# Patient Record
Sex: MALE | Race: WHITE | NOT HISPANIC OR LATINO | Employment: FULL TIME | URBAN - METROPOLITAN AREA
[De-identification: names, ages, dates, MRNs, and addresses within clinical notes are randomized per-mention and may not be internally consistent; named-entity substitution may affect disease eponyms.]

---

## 2017-01-11 ENCOUNTER — OFFICE VISIT (OUTPATIENT)
Dept: MEDICAL GROUP | Facility: MEDICAL CENTER | Age: 57
End: 2017-01-11
Payer: COMMERCIAL

## 2017-01-11 VITALS
HEIGHT: 69 IN | DIASTOLIC BLOOD PRESSURE: 70 MMHG | OXYGEN SATURATION: 95 % | RESPIRATION RATE: 16 BRPM | WEIGHT: 253 LBS | HEART RATE: 78 BPM | SYSTOLIC BLOOD PRESSURE: 142 MMHG | BODY MASS INDEX: 37.47 KG/M2 | TEMPERATURE: 97.3 F

## 2017-01-11 DIAGNOSIS — I48.0 PAF (PAROXYSMAL ATRIAL FIBRILLATION) (HCC): ICD-10-CM

## 2017-01-11 DIAGNOSIS — J02.0 STREP THROAT: ICD-10-CM

## 2017-01-11 LAB
INT CON NEG: NEGATIVE
INT CON POS: POSITIVE
S PYO AG THROAT QL: NORMAL

## 2017-01-11 PROCEDURE — 87880 STREP A ASSAY W/OPTIC: CPT | Performed by: NURSE PRACTITIONER

## 2017-01-11 PROCEDURE — 99214 OFFICE O/P EST MOD 30 MIN: CPT | Performed by: NURSE PRACTITIONER

## 2017-01-11 RX ORDER — AMOXICILLIN 875 MG/1
875 TABLET, COATED ORAL 2 TIMES DAILY
Qty: 20 TAB | Refills: 0 | Status: SHIPPED | OUTPATIENT
Start: 2017-01-11 | End: 2017-04-18

## 2017-01-11 ASSESSMENT — ENCOUNTER SYMPTOMS
FEVER: 1
SORE THROAT: 1
COUGH: 1

## 2017-01-11 NOTE — PROGRESS NOTES
Subjective:      Jude Jiménez is a 56 y.o. male who presents with Sore Throat; Cough; and Runny Nose            Cough  Associated symptoms include a fever and a sore throat.   Jude Jiménez is here today for sore throat.    1. Strep throat  Patient reports his symptoms started about 10 days ago. His main complaint is sore throat although he also complains of mild cough and possible low-grade fevers. Symptoms are worse with swallowing and nothing makes them feel better. He has not tried anything over-the-counter. He denies chills, myalgias, dizziness, chest pain or shortness of breath.    2. PAF (paroxysmal atrial fibrillation) ([facility])  Patient has history of paroxysmal atrial fibrillation and he states he has been off anticoagulation except for a regular strength aspirin. It does not appear that he is seeing his cardiologist since 2014. He denies palpitations, shortness of breath or chest pain. He states he has an appointment with cardiology although I cannot find this in EPIC. He states he is currently taking no beta blockers.    Social History   Substance Use Topics   • Smoking status: Former Smoker -- 0.50 packs/day for 15 years     Types: Cigarettes     Quit date: 01/01/1992   • Smokeless tobacco: Never Used   • Alcohol Use: 33.6 oz/week     42 Glasses of wine, 14 Shots of liquor per week      Comment: around 6 glasses of wine every day and 2-3 shots of tequilla     Current Outpatient Prescriptions   Medication Sig Dispense Refill   • metoprolol (LOPRESSOR) 25 MG Tab Take 1 Tab by mouth 2 times a day. 60 Tab 5   • amoxicillin (AMOXIL) 875 MG tablet Take 1 Tab by mouth 2 times a day. 20 Tab 0     No current facility-administered medications for this visit.     Past Medical History   Diagnosis Date   • AF (atrial fibrillation) ([facility]) 6/24/2010   • Alcohol abuse    • Chronic anticoagulation    • Atrial fibrillation ([facility]) July 11, 2014     AF with RVR   • Infectious disease    • Hypertension    •  "Arthritis      \"rhematoid eyes\"   • Snoring    • JACE (obstructive sleep apnea) 6/24/2010     uses CPAP   • Sleep apnea    • Other emphysema ([facility])    • Cold 6/25/14   • Pneumonia 6/25/14     ?   • Bronchitis 6/25/14     ?   • PAF (paroxysmal atrial fibrillation) ([facility]) 10/1/2014     Family History   Problem Relation Age of Onset   • Hypertension Mother        Review of Systems   Constitutional: Positive for fever.   HENT: Positive for sore throat.    Respiratory: Positive for cough.    All other systems reviewed and are negative.         Objective:     /70 mmHg  Pulse 78  Temp(Src) 36.3 °C (97.3 °F)  Resp 16  Ht 1.753 m (5' 9\")  Wt 114.76 kg (253 lb)  BMI 37.34 kg/m2  SpO2 95%     Physical Exam   Constitutional: He is oriented to person, place, and time. He appears well-developed and well-nourished. No distress.   HENT:   Head: Normocephalic and atraumatic.   Right Ear: External ear normal.   Left Ear: External ear normal.   Nose: Nose normal.   Mouth/Throat: Posterior oropharyngeal erythema present.   Eyes: Conjunctivae are normal. Right eye exhibits no discharge. Left eye exhibits no discharge.   Neck: Normal range of motion. Neck supple. No tracheal deviation present. No thyromegaly present.   Cardiovascular: Normal rate and normal heart sounds.  An irregularly irregular rhythm present.   No murmur heard.  Pulmonary/Chest: Effort normal and breath sounds normal. No respiratory distress. He has no wheezes. He has no rales.   Lymphadenopathy:     He has no cervical adenopathy.   Neurological: He is alert and oriented to person, place, and time. Coordination normal.   Skin: Skin is warm and dry. No rash noted. He is not diaphoretic. No erythema.   Psychiatric: He has a normal mood and affect. His behavior is normal. Judgment and thought content normal.   Nursing note and vitals reviewed.              Assessment/Plan:         1. Strep throat  Rapid strep is positive so patient will start on antibiotics and " he reports no allergies to penicillin.  Pharyngitis teaching provided including:  A. Ibuprofen, Aleve, or Tylenol for pain and fever.  B. Chloraseptic spray or lozenges for topical relief.  C. Increase humidity in house.  D. Consider over-the-counter nasal spray if postnasal drip contributing to pain.  E. Avoid spicy or acidic foods and fluids which may irritate the throat.  - POCT Rapid Strep A  - amoxicillin (AMOXIL) 875 MG tablet; Take 1 Tab by mouth 2 times a day.  Dispense: 20 Tab; Refill: 0    2. PAF (paroxysmal atrial fibrillation) ([facility])  Patient has not seen cardiology in about 2 years and although he states he has an appointment I do not see anything on the schedule so I told him to contact his cardiologist to make an appointment. His heart rhythm does sound and atrial fib today so I will start him on metoprolol since his rate is running up around 100. I told him to continue on his full dose aspirin and may need to go on anticoagulation after seeing cardiology. I am not sure if this is paroxysmal.  - metoprolol (LOPRESSOR) 25 MG Tab; Take 1 Tab by mouth 2 times a day.  Dispense: 60 Tab; Refill: 5

## 2017-01-11 NOTE — MR AVS SNAPSHOT
"        Jude Jiménez   2017 8:20 AM   Office Visit   MRN: [id number]    Department:  [address] [place] [address]   Dept Phone:  [phone]    Description:  Male : 1960   Provider:  TOMASA Styles           Reason for Visit     Sore Throat     Cough     Runny Nose           Allergies as of 2017     No Known Allergies      You were diagnosed with     PAF (paroxysmal atrial fibrillation) ([facility])   [317067]       Strep throat   [049474]         Vital Signs     Blood Pressure Pulse Temperature Respirations Height Weight    142/70 mmHg 78 36.3 °C (97.3 °F) 16 1.753 m (5' 9\") 114.76 kg (253 lb)    Body Mass Index Oxygen Saturation Smoking Status             37.34 kg/m2 95% Former Smoker         Basic Information     Date Of Birth Sex Race Ethnicity Preferred Language    1960 Male White Non- English      Problem List              ICD-10-CM Priority Class Noted - Resolved    JACE (obstructive sleep apnea) (Chronic) G47.33 High  2010 - Present    Iritis H20.9   3/17/2011 - Present    Obesity E66.9   2013 - Present    Alcohol abuse F10.10 High  Unknown - Present    Chronic anticoagulation Z79.01 Medium  Unknown - Present    PAF (paroxysmal atrial fibrillation) ([facility]) I48.0   10/1/2014 - Present    IGT (impaired glucose tolerance) R73.02   11/10/2016 - Present      Health Maintenance        Date Due Completion Dates    IMM DTaP/Tdap/Td Vaccine (1 - Tdap) 1978 ---    COLON CANCER SCREENING ANNUAL FIT 3/20/2017 3/20/2016            Current Immunizations     Influenza Vaccine Quad Inj (Pf) 11/10/2016      Below and/or attached are the medications your provider expects you to take. Review all of your home medications and newly ordered medications with your provider and/or pharmacist. Follow medication instructions as directed by your provider and/or pharmacist. Please keep your medication list with you and share with your provider. Update the information when medications are " discontinued, doses are changed, or new medications (including over-the-counter products) are added; and carry medication information at all times in the event of emergency situations     Allergies:  No Known Allergies          Medications  Valid as of: January 11, 2017 -  8:22 AM    Generic Name Brand Name Tablet Size Instructions for use    Amoxicillin (Tab) AMOXIL 875 MG Take 1 Tab by mouth 2 times a day.        Metoprolol Tartrate (Tab) LOPRESSOR 25 MG Take 1 Tab by mouth 2 times a day.        .                 Medicines prescribed today were sent to:     [facility] [address] [place] [address], [place] - [address]    [address] [place] [address]    Phone: [phone] Fax: [phone]    Open 24 Hours?: No      Medication refill instructions:       If your prescription bottle indicates you have medication refills left, it is not necessary to call your provider’s office. Please contact your pharmacy and they will refill your medication.    If your prescription bottle indicates you do not have any refills left, you may request refills at any time through one of the following ways: The online [employer] system (except Urgent Care), by calling your provider’s office, or by asking your pharmacy to contact your provider’s office with a refill request. Medication refills are processed only during regular business hours and may not be available until the next business day. Your provider may request additional information or to have a follow-up visit with you prior to refilling your medication.   *Please Note: Medication refills are assigned a new Rx number when refilled electronically. Your pharmacy may indicate that no refills were authorized even though a new prescription for the same medication is available at the pharmacy. Please request the medicine by name with the pharmacy before contacting your provider for a refill.           MyChart Status: Patient Declined

## 2017-03-16 ENCOUNTER — OFFICE VISIT (OUTPATIENT)
Dept: CARDIOLOGY | Facility: MEDICAL CENTER | Age: 57
End: 2017-03-16
Payer: COMMERCIAL

## 2017-03-16 VITALS
DIASTOLIC BLOOD PRESSURE: 90 MMHG | HEIGHT: 69 IN | WEIGHT: 266 LBS | SYSTOLIC BLOOD PRESSURE: 130 MMHG | BODY MASS INDEX: 39.4 KG/M2 | OXYGEN SATURATION: 95 % | HEART RATE: 82 BPM

## 2017-03-16 DIAGNOSIS — I48.0 PAF (PAROXYSMAL ATRIAL FIBRILLATION) (HCC): ICD-10-CM

## 2017-03-16 LAB — EKG IMPRESSION: NORMAL

## 2017-03-16 PROCEDURE — 93000 ELECTROCARDIOGRAM COMPLETE: CPT | Performed by: INTERNAL MEDICINE

## 2017-03-16 PROCEDURE — 99214 OFFICE O/P EST MOD 30 MIN: CPT | Performed by: INTERNAL MEDICINE

## 2017-03-16 RX ORDER — IBUPROFEN 200 MG
200 TABLET ORAL EVERY 6 HOURS PRN
COMMUNITY
End: 2017-03-16

## 2017-03-16 RX ORDER — METOPROLOL SUCCINATE 100 MG/1
100 TABLET, EXTENDED RELEASE ORAL DAILY
Qty: 30 TAB | Refills: 11 | Status: SHIPPED | OUTPATIENT
Start: 2017-03-16 | End: 2018-03-16 | Stop reason: SDUPTHER

## 2017-03-16 NOTE — Clinical Note
"     [facility]   [address]  [place] [address]  Phone: [phone]  Fax: [phone]              Jude Jiménez  1960    Encounter Date: 3/16/2017    Wayne Fleming M.D.          PROGRESS NOTE:  Subjective:   Jude Jiménez is a 56 y.o. male who presents today for follow-up visit atrial fibrillation. He has a history of cardioversion in August 2014. He has not had follow-up since that cardioversion.    Patient feels he went back into atrial fibrillation at the end of 2014. However, he has not had cardiology follow-up. Anticoagulation was discontinued a couple of months after his cardioversion. He continues on aspirin therapy.    He's noted no palpitations or lightheadedness. He has noted some dyspnea on exertion when he walks about a quarter of a mile. He's had no PND, orthopnea or edema. He's noted no chest discomfort.    Past Medical History   Diagnosis Date   • AF (atrial fibrillation) (CMS-HCC) 6/24/2010   • Alcohol abuse    • Chronic anticoagulation    • Atrial fibrillation (CMS-HCC) July 11, 2014     AF with RVR   • Infectious disease    • Hypertension    • Arthritis      \"rhematoid eyes\"   • Snoring    • JACE (obstructive sleep apnea) 6/24/2010     uses CPAP   • Sleep apnea    • Other emphysema (CMS-HCC)    • Cold 6/25/14   • Pneumonia 6/25/14     ?   • Bronchitis 6/25/14     ?   • PAF (paroxysmal atrial fibrillation) (CMS-HCC) 10/1/2014     Past Surgical History   Procedure Laterality Date   • Recovery  8/7/2014     Performed by Cath-Recovery Surgery at SURGERY SAME DAY [facility] ORS     Family History   Problem Relation Age of Onset   • Hypertension Mother      History   Smoking status   • Former Smoker -- 0.50 packs/day for 15 years   • Types: Cigarettes   • Quit date: 01/01/1992   Smokeless tobacco   • Never Used     No Known Allergies  Outpatient Encounter Prescriptions as of 3/16/2017   Medication Sig Dispense Refill   • " "aspirin EC (ECOTRIN) 325 MG Tablet Delayed Response Take 325 mg by mouth every day.     • ibuprofen (MOTRIN) 200 MG Tab Take 200 mg by mouth every 6 hours as needed.     • metoprolol (LOPRESSOR) 25 MG Tab Take 1 Tab by mouth 2 times a day. 60 Tab 5   • amoxicillin (AMOXIL) 875 MG tablet Take 1 Tab by mouth 2 times a day. (Patient not taking: Reported on 3/16/2017) 20 Tab 0     No facility-administered encounter medications on file as of 3/16/2017.     ROS     Objective:   /90 mmHg  Pulse 82  Ht 1.753 m (5' 9.02\")  Wt 120.657 kg (266 lb)  BMI 39.26 kg/m2  SpO2 95%    Physical Exam   Neck: No JVD present.   Cardiovascular: Normal rate.  An irregularly irregular rhythm present. Exam reveals no gallop.    No murmur heard.  Pulmonary/Chest: Effort normal. He has no rales.   Abdominal: Soft. There is no tenderness.   Musculoskeletal: He exhibits no edema.     Echocardiogram:  CONCLUSIONS  Technically difficult study.   Left ventricle is mildly dilated.  Mild concentric left ventricular   hypertrophy suggested.  Mildly reduced left ventricular systolic function.  Moderately dilated left atrium.  Mild mitral regurgitation.  Mild aortic insufficiency.  Atrial fibrillation.  Probable moderate pulmonary hypertension.  Other findings as noted above.  Final Date:     21 July 2014     Assessment:     1. PAF (paroxysmal atrial fibrillation) ([facility])         Medical Decision Making:  Today's Assessment / Status / Plan:     Mr. Jiménez is having difficulty with atrial fibrillation which is probably persistent at the present time. He'll be reevaluated with an echocardiogram. Until we have his echocardiogram results and decide whether or not he should undergo cardioversion, we will place him on anticoagulation therapy. He will follow-up in about a month. He will see Dr. Quiñonez if possible. Otherwise, he will follow-up with myself or one of our nurse practitioners.      Sahil Marquez M.D.   Rebecca Cohen " [address]  [place] [id number]  VIA In Basket

## 2017-03-16 NOTE — MR AVS SNAPSHOT
"        Jude Jiménez   3/16/2017 7:30 AM   Office Visit   MRN: [id number]    Department:  [facility]   Dept Phone:  [phone]    Description:  Male : 1960   Provider:  Wayne Fleming M.D.           Reason for Visit     Follow-Up           Allergies as of 3/16/2017     No Known Allergies      You were diagnosed with     PAF (paroxysmal atrial fibrillation) ([facility])   [[id number]]       Paroxysmal atrial fibrillation (CMS-HCC)   [842908]         Vital Signs     Blood Pressure Pulse Height Weight Body Mass Index Oxygen Saturation    130/90 mmHg 82 1.753 m (5' 9.02\") 120.657 kg (266 lb) 39.26 kg/m2 95%    Smoking Status                   Former Smoker           Basic Information     Date Of Birth Sex Race Ethnicity Preferred Language    1960 Male White Non- English      Your appointments     Apr 10, 2017  7:15 AM   ECHO with [facility] EXAM 9   [facility]    [address]  [place] [address]   [phone]           No prep            2017  7:40 AM   FOLLOW UP with TOMASA Worthy   [facility] (--)    [address], [address]  [place] [id number] [phone]              Problem List              ICD-10-CM Priority Class Noted - Resolved    JACE (obstructive sleep apnea) (Chronic) G47.33 High  2010 - Present    Iritis H20.9   3/17/2011 - Present    Obesity E66.9   2013 - Present    Alcohol abuse F10.10 High  Unknown - Present    Chronic anticoagulation Z79.01 Medium  Unknown - Present    PAF (paroxysmal atrial fibrillation) (CMS-HCC) I48.0   10/1/2014 - Present    IGT (impaired glucose tolerance) R73.02   11/10/2016 - Present      Health Maintenance        Date Due Completion Dates    IMM DTaP/Tdap/Td Vaccine (1 - Tdap) 1978 ---    COLON CANCER SCREENING ANNUAL FIT 3/20/2017 3/20/2016            Results       Current Immunizations     Influenza Vaccine Quad Inj (Pf) 11/10/2016      Below " and/or attached are the medications your provider expects you to take. Review all of your home medications and newly ordered medications with your provider and/or pharmacist. Follow medication instructions as directed by your provider and/or pharmacist. Please keep your medication list with you and share with your provider. Update the information when medications are discontinued, doses are changed, or new medications (including over-the-counter products) are added; and carry medication information at all times in the event of emergency situations     Allergies:  No Known Allergies          Medications  Valid as of: March 16, 2017 -  8:06 AM    Generic Name Brand Name Tablet Size Instructions for use    Amoxicillin (Tab) AMOXIL 875 MG Take 1 Tab by mouth 2 times a day.        Apixaban (Tab) ELIQUIS 5mg Take 1 Tab by mouth 2 Times a Day.        Metoprolol Succinate (TABLET SR 24 HR) TOPROL  MG Take 1 Tab by mouth every day.        Metoprolol Tartrate (Tab) LOPRESSOR 25 MG Take 1 Tab by mouth 2 times a day.        .                 Medicines prescribed today were sent to:     [facility] [address] [place], [place] [address] [address] [place], [place] [address] [address] [place] [address]    Phone: [phone] Fax: [phone]    Open 24 Hours?: No      Medication refill instructions:       If your prescription bottle indicates you have medication refills left, it is not necessary to call your provider’s office. Please contact your pharmacy and they will refill your medication.    If your prescription bottle indicates you do not have any refills left, you may request refills at any time through one of the following ways: The online [email or web address] system (except Urgent Care), by calling your provider’s office, or by asking your pharmacy to contact your provider’s office with a refill request. Medication refills are processed only during regular business hours and may not be available until the next business day. Your provider may request additional  information or to have a follow-up visit with you prior to refilling your medication.   *Please Note: Medication refills are assigned a new Rx number when refilled electronically. Your pharmacy may indicate that no refills were authorized even though a new prescription for the same medication is available at the pharmacy. Please request the medicine by name with the pharmacy before contacting your provider for a refill.        Your To Do List     Future Labs/Procedures Complete By Expires    Echocardiogram Comp w/o Cont  As directed 3/16/2018    Scheduling Instructions:    Less than one month         MyChart Status: Patient Declined

## 2017-03-16 NOTE — PROGRESS NOTES
"Subjective:   Jude Jiménez is a 56 y.o. male who presents today for follow-up visit atrial fibrillation. He has a history of cardioversion in August 2014. He has not had follow-up since that cardioversion.    Patient feels he went back into atrial fibrillation at the end of 2014. However, he has not had cardiology follow-up. Anticoagulation was discontinued a couple of months after his cardioversion. He continues on aspirin therapy.    He's noted no palpitations or lightheadedness. He has noted some dyspnea on exertion when he walks about a quarter of a mile. He's had no PND, orthopnea or edema. He's noted no chest discomfort.    Past Medical History   Diagnosis Date   • AF (atrial fibrillation) (CMS-HCC) 6/24/2010   • Alcohol abuse    • Chronic anticoagulation    • Atrial fibrillation (CMS-HCC) July 11, 2014     AF with RVR   • Infectious disease    • Hypertension    • Arthritis      \"rhematoid eyes\"   • Snoring    • JACE (obstructive sleep apnea) 6/24/2010     uses CPAP   • Sleep apnea    • Other emphysema (CMS-HCC)    • Cold 6/25/14   • Pneumonia 6/25/14     ?   • Bronchitis 6/25/14     ?   • PAF (paroxysmal atrial fibrillation) (CMS-HCC) 10/1/2014     Past Surgical History   Procedure Laterality Date   • Recovery  8/7/2014     Performed by Cath-Recovery Surgery at SURGERY SAME DAY [facility]     Family History   Problem Relation Age of Onset   • Hypertension Mother      History   Smoking status   • Former Smoker -- 0.50 packs/day for 15 years   • Types: Cigarettes   • Quit date: 01/01/1992   Smokeless tobacco   • Never Used     No Known Allergies  Outpatient Encounter Prescriptions as of 3/16/2017   Medication Sig Dispense Refill   • aspirin EC (ECOTRIN) 325 MG Tablet Delayed Response Take 325 mg by mouth every day.     • ibuprofen (MOTRIN) 200 MG Tab Take 200 mg by mouth every 6 hours as needed.     • metoprolol (LOPRESSOR) 25 MG Tab Take 1 Tab by mouth 2 times a day. 60 Tab 5   • amoxicillin (AMOXIL) " "875 MG tablet Take 1 Tab by mouth 2 times a day. (Patient not taking: Reported on 3/16/2017) 20 Tab 0     No facility-administered encounter medications on file as of 3/16/2017.     ROS     Objective:   /90 mmHg  Pulse 82  Ht 1.753 m (5' 9.02\")  Wt 120.657 kg (266 lb)  BMI 39.26 kg/m2  SpO2 95%    Physical Exam   Neck: No JVD present.   Cardiovascular: Normal rate.  An irregularly irregular rhythm present. Exam reveals no gallop.    No murmur heard.  Pulmonary/Chest: Effort normal. He has no rales.   Abdominal: Soft. There is no tenderness.   Musculoskeletal: He exhibits no edema.     Echocardiogram:  CONCLUSIONS  Technically difficult study.   Left ventricle is mildly dilated.  Mild concentric left ventricular   hypertrophy suggested.  Mildly reduced left ventricular systolic function.  Moderately dilated left atrium.  Mild mitral regurgitation.  Mild aortic insufficiency.  Atrial fibrillation.  Probable moderate pulmonary hypertension.  Other findings as noted above.  Final Date:     21 July 2014     Assessment:     1. PAF (paroxysmal atrial fibrillation) ([facility])         Medical Decision Making:  Today's Assessment / Status / Plan:     Mr. Jiménez is having difficulty with atrial fibrillation which is probably persistent at the present time. He'll be reevaluated with an echocardiogram. Until we have his echocardiogram results and decide whether or not he should undergo cardioversion, we will place him on anticoagulation therapy. He will follow-up in about a month. He will see Dr. Quiñonez if possible. Otherwise, he will follow-up with myself or one of our nurse practitioners.  "

## 2017-03-17 ENCOUNTER — TELEPHONE (OUTPATIENT)
Dept: CARDIOLOGY | Facility: MEDICAL CENTER | Age: 57
End: 2017-03-17

## 2017-03-17 DIAGNOSIS — I48.0 PAF (PAROXYSMAL ATRIAL FIBRILLATION) (HCC): ICD-10-CM

## 2017-03-17 NOTE — TELEPHONE ENCOUNTER
----- Message from Bertha Richard, Med Ass't sent at 3/17/2017 11:45 AM PDT -----  Regarding: RE: Prescription for Eliquis is too expensive  I believe it is Xarelto but I would recommend that the patient call their plan and see which on is at a lower tier    ----- Message -----     From: Loyda Munson R.N.     Sent: 3/17/2017   9:42 AM       To: Bertha Richard, Med Ass't  Subject: FW: Prescription for Eliquis is too expensive    Hi Bertha,    Do you know which anticoagulation medication is preferred by [facility]?    ELADIO RN    ----- Message -----     From: Trish Washington     Sent: 3/17/2017   7:49 AM       To: Loyda Munson R.N.  Subject: Prescription for Eliquis is too expensive        ELDER/Loyda    Patient was given a prescription for Eliquis, but it costs $100.00 and he can't afford it. He wants a call back at [phone] to find out what his options are.

## 2017-03-17 NOTE — TELEPHONE ENCOUNTER
Left patient a voicemail with instructions to call the office regarding his medication issue.    ELADIO MORENO

## 2017-03-20 NOTE — TELEPHONE ENCOUNTER
Spoke with Dr. Fleming via [employer], patient can be prescribed Xarelto 20 mg daily as an alternative to Eliquis.    Prescription called in to [facility], Eliquis discontinued.    Called patient and advised him of the above.    ELADIO RN

## 2017-04-10 ENCOUNTER — HOSPITAL ENCOUNTER (OUTPATIENT)
Dept: CARDIOLOGY | Facility: MEDICAL CENTER | Age: 57
End: 2017-04-10
Attending: INTERNAL MEDICINE
Payer: COMMERCIAL

## 2017-04-10 DIAGNOSIS — I48.0 PAF (PAROXYSMAL ATRIAL FIBRILLATION) (HCC): ICD-10-CM

## 2017-04-10 PROCEDURE — 93306 TTE W/DOPPLER COMPLETE: CPT

## 2017-04-10 PROCEDURE — 93306 TTE W/DOPPLER COMPLETE: CPT | Mod: 26 | Performed by: INTERNAL MEDICINE

## 2017-04-11 LAB — LV EJECT FRACT  99904: 60

## 2017-04-18 ENCOUNTER — OFFICE VISIT (OUTPATIENT)
Dept: CARDIOLOGY | Facility: MEDICAL CENTER | Age: 57
End: 2017-04-18
Payer: COMMERCIAL

## 2017-04-18 VITALS
WEIGHT: 269 LBS | BODY MASS INDEX: 39.84 KG/M2 | OXYGEN SATURATION: 94 % | DIASTOLIC BLOOD PRESSURE: 74 MMHG | SYSTOLIC BLOOD PRESSURE: 116 MMHG | HEIGHT: 69 IN | HEART RATE: 56 BPM

## 2017-04-18 DIAGNOSIS — Z79.01 CHRONIC ANTICOAGULATION: ICD-10-CM

## 2017-04-18 DIAGNOSIS — G47.33 OSA (OBSTRUCTIVE SLEEP APNEA): Chronic | ICD-10-CM

## 2017-04-18 DIAGNOSIS — I48.0 PAF (PAROXYSMAL ATRIAL FIBRILLATION) (HCC): ICD-10-CM

## 2017-04-18 PROCEDURE — 99214 OFFICE O/P EST MOD 30 MIN: CPT | Performed by: NURSE PRACTITIONER

## 2017-04-18 ASSESSMENT — ENCOUNTER SYMPTOMS
DIZZINESS: 0
SHORTNESS OF BREATH: 0
WEAKNESS: 0
WHEEZING: 0
FOCAL WEAKNESS: 0
BLURRED VISION: 0
FALLS: 0
COUGH: 0
PALPITATIONS: 0
BLOOD IN STOOL: 0
BRUISES/BLEEDS EASILY: 0
DOUBLE VISION: 0
LOSS OF CONSCIOUSNESS: 0
ORTHOPNEA: 0
HEADACHES: 0

## 2017-04-18 NOTE — MR AVS SNAPSHOT
"        Jude Jiménez   2017 7:40 AM   Office Visit   MRN: [id number]    Department:  [facility]   Dept Phone:  [phone]    Description:  Male : 1960   Provider:  WERO Alves           Reason for Visit     Follow-Up           Allergies as of 2017     No Known Allergies      You were diagnosed with     PAF (paroxysmal atrial fibrillation) (CMS-[facility])   [622790]       JACE (obstructive sleep apnea)   [201724]       Chronic anticoagulation   [322622]         Vital Signs     Blood Pressure Pulse Height Weight Body Mass Index Oxygen Saturation    116/74 mmHg 55 1.753 m (5' 9\") 122.018 kg (269 lb) 39.71 kg/m2 94%    Smoking Status                   Former Smoker           Basic Information     Date Of Birth Sex Race Ethnicity Preferred Language    1960 Male White Non- English      Problem List              ICD-10-CM Priority Class Noted - Resolved    JACE (obstructive sleep apnea) (Chronic) G47.33 High  2010 - Present    Iritis H20.9   3/17/2011 - Present    Obesity E66.9   2013 - Present    Alcohol abuse F10.10 High  Unknown - Present    Chronic anticoagulation Z79.01 Medium  Unknown - Present    PAF (paroxysmal atrial fibrillation) (CMS-HCC) I48.0   10/1/2014 - Present    IGT (impaired glucose tolerance) R73.02   11/10/2016 - Present      Health Maintenance        Date Due Completion Dates    IMM DTaP/Tdap/Td Vaccine (1 - Tdap) 1978 ---    COLON CANCER SCREENING ANNUAL FIT 3/20/2017 3/20/2016            Current Immunizations     Influenza Vaccine Quad Inj (Pf) 11/10/2016      Below and/or attached are the medications your provider expects you to take. Review all of your home medications and newly ordered medications with your provider and/or pharmacist. Follow medication instructions as directed by your provider and/or pharmacist. Please keep your medication list with you and share with your provider. Update the information when medications are " discontinued, doses are changed, or new medications (including over-the-counter products) are added; and carry medication information at all times in the event of emergency situations     Allergies:  No Known Allergies          Medications  Valid as of: April 18, 2017 -  8:16 AM    Generic Name Brand Name Tablet Size Instructions for use    Metoprolol Succinate (TABLET SR 24 HR) TOPROL  MG Take 1 Tab by mouth every day.        Rivaroxaban (Tab) XARELTO 20 MG Take 1 Tab by mouth with dinner.        .                 Medicines prescribed today were sent to:     [facility] [address] [place] [address], [place] - [address]    [address] [place] [address]    Phone: [phone] Fax: [phone]    Open 24 Hours?: No      Medication refill instructions:       If your prescription bottle indicates you have medication refills left, it is not necessary to call your provider’s office. Please contact your pharmacy and they will refill your medication.    If your prescription bottle indicates you do not have any refills left, you may request refills at any time through one of the following ways: The online [employer] system (except Urgent Care), by calling your provider’s office, or by asking your pharmacy to contact your provider’s office with a refill request. Medication refills are processed only during regular business hours and may not be available until the next business day. Your provider may request additional information or to have a follow-up visit with you prior to refilling your medication.   *Please Note: Medication refills are assigned a new Rx number when refilled electronically. Your pharmacy may indicate that no refills were authorized even though a new prescription for the same medication is available at the pharmacy. Please request the medicine by name with the pharmacy before contacting your provider for a refill.        Referral     A referral request has been sent to our patient care coordination department.  Please allow 3-5 business days for us to process this request and contact you either by phone or mail. If you do not hear from us by the 5th business day, please call us at [phone].           MyChart Status: Patient Declined

## 2017-04-18 NOTE — PROGRESS NOTES
"Subjective:   Jude Jiménez is a 56 y.o. male who presents today for follow up P. Afib and JACE.    He is patient of Dr. Fleming in our clinic, HX: Paroxysmal atrial fibrillation, hypertension, emphysema, and obstructive sleep apnea.    Patient was seen about a month ago for follow-up atrial fibrillation with Dr. Fleming. He has noted some dyspnea on exertion one he walks about a quarter mile. Echo was ordered showed ejection fraction of 60%. Diastolic function is difficult to assess with atrial fibrillation. Moderately dilated left atrium. Patient stated he went back into A. fib at the end of 2016. Previously he was cardioverted and remained in normal sinus rhythm for 2 years.     Patient is doing well, started on xarelto 20mg. Patient has been compliant with his medication. He wants to further treatment for atrial fibrillation and see electrophysiologist for possible medication conversion versus cardioversion.     He has sleep apnea and has been wearing CPAP. He hasn't seen a pulmonologist for years to check his CPAP settings.    He has had no episodes of chest pain, palpitations, dizziness/lightheadedness, shortness of breath, orthopnea, or peripheral edema.    Past Medical History   Diagnosis Date   • AF (atrial fibrillation) (CMS-HCC) 6/24/2010   • Alcohol abuse    • Chronic anticoagulation    • Atrial fibrillation (CMS-HCC) July 11, 2014     AF with RVR   • Infectious disease    • Hypertension    • Arthritis      \"rhematoid eyes\"   • Snoring    • JACE (obstructive sleep apnea) 6/24/2010     uses CPAP   • Sleep apnea    • Other emphysema (CMS-HCC)    • Cold 6/25/14   • Pneumonia 6/25/14     ?   • Bronchitis 6/25/14     ?   • PAF (paroxysmal atrial fibrillation) (CMS-HCC) 10/1/2014     Past Surgical History   Procedure Laterality Date   • Recovery  8/7/2014     Performed by Cath-Recovery Surgery at SURGERY SAME DAY [facility] ORS     Family History   Problem Relation Age of Onset   • Hypertension Mother  " "    History   Smoking status   • Former Smoker -- 0.50 packs/day for 15 years   • Types: Cigarettes   • Quit date: 01/01/1992   Smokeless tobacco   • Never Used     No Known Allergies  Outpatient Encounter Prescriptions as of 4/18/2017   Medication Sig Dispense Refill   • rivaroxaban (XARELTO) 20 MG Tab tablet Take 1 Tab by mouth with dinner. 30 Tab 11   • metoprolol SR (TOPROL XL) 100 MG TABLET SR 24 HR Take 1 Tab by mouth every day. 30 Tab 11   • [DISCONTINUED] metoprolol (LOPRESSOR) 25 MG Tab Take 1 Tab by mouth 2 times a day. 60 Tab 5   • [DISCONTINUED] amoxicillin (AMOXIL) 875 MG tablet Take 1 Tab by mouth 2 times a day. (Patient not taking: Reported on 3/16/2017) 20 Tab 0     No facility-administered encounter medications on file as of 4/18/2017.     Review of Systems   Constitutional: Negative for malaise/fatigue.   Eyes: Negative for blurred vision and double vision.   Respiratory: Negative for cough, shortness of breath and wheezing.    Cardiovascular: Negative for chest pain, palpitations, orthopnea and leg swelling.   Gastrointestinal: Negative for blood in stool and melena.   Musculoskeletal: Negative for falls.   Neurological: Negative for dizziness, focal weakness, loss of consciousness, weakness and headaches.   Endo/Heme/Allergies: Does not bruise/bleed easily.   All other systems reviewed and are negative.       Objective:   /74 mmHg  Pulse 56  Ht 1.753 m (5' 9.02\")  Wt 122.018 kg (269 lb)  BMI 39.71 kg/m2  SpO2 94%    Physical Exam   Constitutional: He is oriented to person, place, and time. He appears well-developed and well-nourished.   HENT:   Head: Normocephalic.   Neck: Normal range of motion. No JVD present.   Cardiovascular: Normal heart sounds.  An irregularly irregular rhythm present. Bradycardia present.    No murmur heard.  Pulmonary/Chest: Effort normal and breath sounds normal. No respiratory distress. He has no wheezes.   Abdominal: Bowel sounds are normal. "   Musculoskeletal: He exhibits no edema or tenderness.   Neurological: He is alert and oriented to person, place, and time.   Skin: Skin is warm and dry.   Psychiatric: His behavior is normal. Judgment normal.   Nursing note and vitals reviewed.      Echocardiography Laboratory  4/10/2017  Technically difficult but adequate study for interpretation.   3 ml Optison contrast was used to enhance definition of the endocardial   borders.   Normal left ventricular size and systolic function. Mild concentric   left ventricular hypertrophy. Left ventricular ejection fraction is   visually estimated to be 60%. Diastolic function is difficult to assess   with atrial fibrillation.   Moderately dilated left atrium.  Compared to the report of the study done 7/2014 - there has been an   improvement in LVEF and estimated RVSP.     Lab Results   Component Value Date/Time    CHOLESTEROL, 11/11/2016 07:20 AM    * 11/11/2016 07:20 AM    HDL 51 11/11/2016 07:20 AM    TRIGLYCERIDES 132 11/11/2016 07:20 AM       Lab Results   Component Value Date/Time    SODIUM 138 11/11/2016 07:20 AM    POTASSIUM 4.3 11/11/2016 07:20 AM    CHLORIDE 100 11/11/2016 07:20 AM    CO2 23 11/11/2016 07:20 AM    GLUCOSE 79 11/11/2016 07:20 AM    BUN 23 11/11/2016 07:20 AM    CREATININE 0.78 11/11/2016 07:20 AM    BUN-CREATININE RATIO 29* 11/11/2016 07:20 AM     Lab Results   Component Value Date/Time    ALKALINE PHOSPHATASE 89 11/11/2016 07:20 AM    AST(SGOT) 24 11/11/2016 07:20 AM    ALT(SGPT) 28 11/11/2016 07:20 AM    TOTAL BILIRUBIN 0.6 11/11/2016 07:20 AM            Assessment:     1. PAF (paroxysmal atrial fibrillation) (CMS-[facility])  REFERRAL TO PULMONOLOGY   2. JACE (obstructive sleep apnea)  REFERRAL TO PULMONOLOGY   3. Chronic anticoagulation         Medical Decision Making:  Today's Assessment / Status / Plan:     1. PAF:  - Denies any chest pain or palpitation. His dyspnea on exertion has resolved.   - rate controlled on metoprolol XL 100mg    - oral anticoagulation Xarelto 20 mg. Patient stated that it caused him $100 every month for the prescription. Samples are given to the patient.    2. Obstructive sleep apnea:  - Referral for pulmonary for reevaluation of CPAP setting.     3. Chronic anticoagulation:  - on oral xarelto 20mg   - denies any bleeding or melena     Follow-up with EP for further treatment of atrial fibrillation. He wants further treatment in restoration of sinus rhythm.    Collaborating Provider: Dr. Cartagena

## 2017-05-19 ENCOUNTER — OFFICE VISIT (OUTPATIENT)
Dept: CARDIOLOGY | Facility: MEDICAL CENTER | Age: 57
End: 2017-05-19
Payer: COMMERCIAL

## 2017-05-19 VITALS
DIASTOLIC BLOOD PRESSURE: 92 MMHG | HEIGHT: 69 IN | BODY MASS INDEX: 39.84 KG/M2 | SYSTOLIC BLOOD PRESSURE: 152 MMHG | WEIGHT: 269 LBS | HEART RATE: 100 BPM | OXYGEN SATURATION: 95 %

## 2017-05-19 DIAGNOSIS — Z79.01 CHRONIC ANTICOAGULATION: ICD-10-CM

## 2017-05-19 DIAGNOSIS — E66.01 MORBID OBESITY DUE TO EXCESS CALORIES (HCC): ICD-10-CM

## 2017-05-19 DIAGNOSIS — I48.19 PERSISTENT ATRIAL FIBRILLATION (HCC): ICD-10-CM

## 2017-05-19 DIAGNOSIS — I48.0 PAF (PAROXYSMAL ATRIAL FIBRILLATION) (HCC): ICD-10-CM

## 2017-05-19 DIAGNOSIS — F10.10 ALCOHOL ABUSE: ICD-10-CM

## 2017-05-19 LAB — EKG IMPRESSION: NORMAL

## 2017-05-19 PROCEDURE — 99244 OFF/OP CNSLTJ NEW/EST MOD 40: CPT | Performed by: INTERNAL MEDICINE

## 2017-05-19 PROCEDURE — 93000 ELECTROCARDIOGRAM COMPLETE: CPT | Performed by: INTERNAL MEDICINE

## 2017-05-19 NOTE — MR AVS SNAPSHOT
"        Jude Jiménez   2017 7:40 AM   Office Visit   MRN: [id number]    Department:  [facility]   Dept Phone:  [phone]    Description:  Male : 1960   Provider:  Soy Vides M.D.           Reason for Visit     Follow-Up Previous patient       Allergies as of 2017     No Known Allergies      You were diagnosed with     PAF (paroxysmal atrial fibrillation) (CMS-HCC)   [187385]       Persistent atrial fibrillation (CMS-HCC)   [454962]       Morbid obesity due to excess calories (CMS-HCC)   [0211645]       Alcohol abuse   [514995]       Chronic anticoagulation   [892624]         Vital Signs     Blood Pressure Pulse Height Weight Body Mass Index Oxygen Saturation    152/92 mmHg 100 1.753 m (5' 9.02\") 122.018 kg (269 lb) 39.71 kg/m2 95%    Smoking Status                   Former Smoker           Basic Information     Date Of Birth Sex Race Ethnicity Preferred Language    1960 Male White Non- English      Your appointments     2017  8:00 AM   New Patient with TOMASA Macias   [facility] (--)    [address] [place] [id number] [phone]           Please bring enclosed paperwork completed along with your insurance card and photo ID.              Problem List              ICD-10-CM Priority Class Noted - Resolved    JACE (obstructive sleep apnea) (Chronic) G47.33 High  2010 - Present    Iritis H20.9   3/17/2011 - Present    Obesity E66.9   2013 - Present    Alcohol abuse F10.10 High  Unknown - Present    Chronic anticoagulation Z79.01 Medium  Unknown - Present    IGT (impaired glucose tolerance) R73.02   11/10/2016 - Present    Persistent atrial fibrillation (CMS-HCC) I48.1   2017 - Present      Health Maintenance        Date Due Completion Dates    IMM DTaP/Tdap/Td Vaccine (1 - Tdap) 1978 ---    COLON CANCER SCREENING ANNUAL FIT 3/20/2017 3/20/2016            Results       Current " Immunizations     Influenza Vaccine Quad Inj (Pf) 11/10/2016      Below and/or attached are the medications your provider expects you to take. Review all of your home medications and newly ordered medications with your provider and/or pharmacist. Follow medication instructions as directed by your provider and/or pharmacist. Please keep your medication list with you and share with your provider. Update the information when medications are discontinued, doses are changed, or new medications (including over-the-counter products) are added; and carry medication information at all times in the event of emergency situations     Allergies:  No Known Allergies          Medications  Valid as of: May 19, 2017 -  8:08 AM    Generic Name Brand Name Tablet Size Instructions for use    Metoprolol Succinate (TABLET SR 24 HR) TOPROL  MG Take 1 Tab by mouth every day.        Rivaroxaban (Tab) XARELTO 20 MG Take 1 Tab by mouth with dinner.        .                 Medicines prescribed today were sent to:     [facility] [address] [place] [address], [place] [address]     [address] [place] [address]    Phone: [phone] Fax: [phone]    Open 24 Hours?: No      Medication refill instructions:       If your prescription bottle indicates you have medication refills left, it is not necessary to call your provider’s office. Please contact your pharmacy and they will refill your medication.    If your prescription bottle indicates you do not have any refills left, you may request refills at any time through one of the following ways: The online [employer] system (except Urgent Care), by calling your provider’s office, or by asking your pharmacy to contact your provider’s office with a refill request. Medication refills are processed only during regular business hours and may not be available until the next business day. Your provider may request additional information or to have a follow-up visit with you prior to refilling your medication.      *Please Note: Medication refills are assigned a new Rx number when refilled electronically. Your pharmacy may indicate that no refills were authorized even though a new prescription for the same medication is available at the pharmacy. Please request the medicine by name with the pharmacy before contacting your provider for a refill.           MyChart Status: Patient Declined

## 2017-05-19 NOTE — PROGRESS NOTES
"Subjective:   Jude Jiménez is a 56 y.o. male who presents today in consult on request of Dr Fleming secondary to peristent atrial fib. Previous cardioversion with [facility] in 2014. No real f/u. Back in a fib a few months ago. No real symptoms. Very mild GARRISON. Excessive alcohol use. May have alcohol on board this am. Works two jobs. Wa=ears a CPAP. The patients [id number] score is 0. On NOAC but expensive for him. Recent echo with preserved LV function. Old echo with EF 45 % probably related to poor rate control. Nl TSH in distant past.    Past Medical History   Diagnosis Date   • AF (atrial fibrillation) (CMS-HCC) 6/24/2010   • Alcohol abuse    • Chronic anticoagulation    • Atrial fibrillation (CMS-HCC) July 11, 2014     AF with RVR   • Infectious disease    • Hypertension    • Arthritis      \"rhematoid eyes\"   • Snoring    • JACE (obstructive sleep apnea) 6/24/2010     uses CPAP   • Sleep apnea    • Other emphysema (CMS-HCC)    • Cold 6/25/14   • Pneumonia 6/25/14     ?   • Bronchitis 6/25/14     ?   • PAF (paroxysmal atrial fibrillation) (CMS-HCC) 10/1/2014     Past Surgical History   Procedure Laterality Date   • Recovery  8/7/2014     Performed by Cath-Recovery Surgery at SURGERY SAME DAY [facility] ORS     Family History   Problem Relation Age of Onset   • Hypertension Mother      History   Smoking status   • Former Smoker -- 0.50 packs/day for 15 years   • Types: Cigarettes   • Quit date: 01/01/1992   Smokeless tobacco   • Never Used     No Known Allergies  Outpatient Encounter Prescriptions as of 5/19/2017   Medication Sig Dispense Refill   • rivaroxaban (XARELTO) 20 MG Tab tablet Take 1 Tab by mouth with dinner. 30 Tab 11   • metoprolol SR (TOPROL XL) 100 MG TABLET SR 24 HR Take 1 Tab by mouth every day. 30 Tab 11     No facility-administered encounter medications on file as of 5/19/2017.     ROS     Objective:   /92 mmHg  Pulse 100  Ht 1.753 m (5' 9.02\")  Wt 122.018 kg (269 lb)  BMI 39.71 " kg/m2  SpO2 95%    Physical Exam   Constitutional: He is oriented to person, place, and time. He appears well-developed and well-nourished.   Smell of alcohol   HENT:   Mouth/Throat: Oropharynx is clear and moist.   Eyes: Conjunctivae and EOM are normal.   Neck: No JVD present. No thyroid mass present.   Cardiovascular: Normal rate, S1 normal, S2 normal and normal pulses.  An irregularly irregular rhythm present. PMI is not displaced.  Exam reveals no gallop.    No murmur heard.  Pulses:       Carotid pulses are 2+ on the right side, and 2+ on the left side.       Radial pulses are 2+ on the right side, and 2+ on the left side.        Femoral pulses are 2+ on the right side, and 2+ on the left side.       Dorsalis pedis pulses are 2+ on the right side, and 2+ on the left side.   No peripheral edema.   Pulmonary/Chest: Effort normal and breath sounds normal.   Abdominal: Soft. Normal appearance. He exhibits no abdominal bruit. There is no hepatosplenomegaly. There is no tenderness.   Musculoskeletal: Normal range of motion. He exhibits no edema.        Thoracic back: He exhibits no tenderness and no spasm.   Neurological: He is alert and oriented to person, place, and time.   Skin: No rash noted. No cyanosis. Nails show no clubbing.   Psychiatric: He has a normal mood and affect.       Assessment:     1. PAF (paroxysmal atrial fibrillation) (CMS-[facility])  EKG   2. Persistent atrial fibrillation (CMS-HCC)     3. Morbid obesity due to excess calories (CMS-[facility])     4. Alcohol abuse     5. Chronic anticoagulation         Medical Decision Making:  Today's Assessment / Status / Plan:     1. Persistent atrial fib discussed AA med such as 1C and cardioversion. He wishes to think about it.  2. Anticoagulation continue NOAC. Offered switch to coumadin if wants.  3. Sleep apnea treated.  4. F/U in two months. He will; call us if wants to schedule cardioversion.

## 2017-05-19 NOTE — Clinical Note
"     [facility]   [address], [address]  [place] [address]  Phone: [phone]  Fax: [phone]              Jude Jiménez  1960    Encounter Date: 5/19/2017    Soy Vides M.D.          PROGRESS NOTE:  Subjective:   Jude Jiménez is a 56 y.o. male who presents today in consult on request of Dr Fleming secondary to peristent atrial fib. Previous cardioversion with [facility] in 2014. No real f/u. Back in a fib a few months ago. No real symptoms. Very mild GARRISON. Excessive alcohol use. May have alcohol on board this am. Works two jobs. Wa=ears a CPAP. The patients [id number] score is 0. On NOAC but expensive for him. Recent echo with preserved LV function. Old echo with EF 45 % probably related to poor rate control. Nl TSH in distant past.    Past Medical History   Diagnosis Date   • AF (atrial fibrillation) (CMS-HCC) 6/24/2010   • Alcohol abuse    • Chronic anticoagulation    • Atrial fibrillation (CMS-HCC) July 11, 2014     AF with RVR   • Infectious disease    • Hypertension    • Arthritis      \"rhematoid eyes\"   • Snoring    • JACE (obstructive sleep apnea) 6/24/2010     uses CPAP   • Sleep apnea    • Other emphysema (CMS-HCC)    • Cold 6/25/14   • Pneumonia 6/25/14     ?   • Bronchitis 6/25/14     ?   • PAF (paroxysmal atrial fibrillation) (CMS-HCC) 10/1/2014     Past Surgical History   Procedure Laterality Date   • Recovery  8/7/2014     Performed by Cath-Recovery Surgery at SURGERY SAME DAY [facility] ORS     Family History   Problem Relation Age of Onset   • Hypertension Mother      History   Smoking status   • Former Smoker -- 0.50 packs/day for 15 years   • Types: Cigarettes   • Quit date: 01/01/1992   Smokeless tobacco   • Never Used     No Known Allergies  Outpatient Encounter Prescriptions as of 5/19/2017   Medication Sig Dispense Refill   • rivaroxaban (XARELTO) 20 MG Tab tablet Take 1 Tab by mouth with dinner. 30 Tab 11   • metoprolol " "SR (TOPROL XL) 100 MG TABLET SR 24 HR Take 1 Tab by mouth every day. 30 Tab 11     No facility-administered encounter medications on file as of 5/19/2017.     ROS     Objective:   /92 mmHg  Pulse 100  Ht 1.753 m (5' 9.02\")  Wt 122.018 kg (269 lb)  BMI 39.71 kg/m2  SpO2 95%    Physical Exam   Constitutional: He is oriented to person, place, and time. He appears well-developed and well-nourished.   Smell of alcohol   HENT:   Mouth/Throat: Oropharynx is clear and moist.   Eyes: Conjunctivae and EOM are normal.   Neck: No JVD present. No thyroid mass present.   Cardiovascular: Normal rate, S1 normal, S2 normal and normal pulses.  An irregularly irregular rhythm present. PMI is not displaced.  Exam reveals no gallop.    No murmur heard.  Pulses:       Carotid pulses are 2+ on the right side, and 2+ on the left side.       Radial pulses are 2+ on the right side, and 2+ on the left side.        Femoral pulses are 2+ on the right side, and 2+ on the left side.       Dorsalis pedis pulses are 2+ on the right side, and 2+ on the left side.   No peripheral edema.   Pulmonary/Chest: Effort normal and breath sounds normal.   Abdominal: Soft. Normal appearance. He exhibits no abdominal bruit. There is no hepatosplenomegaly. There is no tenderness.   Musculoskeletal: Normal range of motion. He exhibits no edema.        Thoracic back: He exhibits no tenderness and no spasm.   Neurological: He is alert and oriented to person, place, and time.   Skin: No rash noted. No cyanosis. Nails show no clubbing.   Psychiatric: He has a normal mood and affect.       Assessment:     1. PAF (paroxysmal atrial fibrillation) (CMS-[facility])  EKG   2. Persistent atrial fibrillation (CMS-HCC)     3. Morbid obesity due to excess calories (CMS-HCC)     4. Alcohol abuse     5. Chronic anticoagulation         Medical Decision Making:  Today's Assessment / Status / Plan:     1. Persistent atrial fib discussed AA med such as 1C and cardioversion. He " wishes to think about it.  2. Anticoagulation continue NOAC. Offered switch to coumadin if wants.  3. Sleep apnea treated.  4. F/U in two months. He will; call us if wants to schedule cardioversion.      Sahil Marquez M.D.  [address]  [address]  [place] [id number]  VIA In Basket     Wayne Fleming M.D.  [address]  [address]  [place] [id number]  VIA In Basket

## 2017-06-27 ENCOUNTER — SLEEP CENTER VISIT (OUTPATIENT)
Dept: SLEEP MEDICINE | Facility: MEDICAL CENTER | Age: 57
End: 2017-06-27
Payer: COMMERCIAL

## 2017-06-27 VITALS
BODY MASS INDEX: 39.84 KG/M2 | SYSTOLIC BLOOD PRESSURE: 148 MMHG | HEART RATE: 86 BPM | HEIGHT: 69 IN | RESPIRATION RATE: 16 BRPM | WEIGHT: 269 LBS | OXYGEN SATURATION: 93 % | DIASTOLIC BLOOD PRESSURE: 90 MMHG

## 2017-06-27 DIAGNOSIS — G47.33 OSA (OBSTRUCTIVE SLEEP APNEA): Chronic | ICD-10-CM

## 2017-06-27 DIAGNOSIS — R06.2 WHEEZING: ICD-10-CM

## 2017-06-27 DIAGNOSIS — Z79.01 CHRONIC ANTICOAGULATION: ICD-10-CM

## 2017-06-27 DIAGNOSIS — J30.9 ALLERGIC RHINITIS, UNSPECIFIED ALLERGIC RHINITIS TRIGGER, UNSPECIFIED RHINITIS SEASONALITY: ICD-10-CM

## 2017-06-27 DIAGNOSIS — I48.19 PERSISTENT ATRIAL FIBRILLATION (HCC): ICD-10-CM

## 2017-06-27 PROCEDURE — 99204 OFFICE O/P NEW MOD 45 MIN: CPT | Performed by: NURSE PRACTITIONER

## 2017-06-27 NOTE — PROGRESS NOTES
Chief Complaint   Patient presents with   • Establish Care     Previous PMA patient        HPI:  Jude Jiménez is a 56 y.o. year old male here to re-establish care for his obstructive sleep apnea. He was referred back to our office by Cardiology for a reevaluation of his sleep apnea as he had recurrent atrial fibrillation. He was last seen in the office in April 2008. Prior sleep study indicated severe obstructive sleep apnea with an RDI of 96.9. He has been on CPAP at 12 CM H20. His current machine does not have AHI capability. He tolerates his current pressure. He has a full face mask which he feels is comfortable. He is due for a new mask and supplies. He does feel he sleeps better on therapy and wakes more refreshed. He does work [employer]. He notes occasional fatigue if he does not log enough hours of sleep. He states if he is able to log 7-8 hours of sleep then he does not experience daytime fatigue. [place] sleepiness score is 7. BMI is 39.72. He states his weight has been relatively stable.     He states he has had 2 episodes of bronchitis this past year. He states his PCP has treated him with antibiotics. He has a 10 PYH of tobacco abuse. He quit smoking in 1998. He states his last respiratory infection was over 6 months ago. He is not on inhalers currently. He notes mild dyspnea with exercise. He feels he recovers well with rest. He notes an occasional cough in the morning which is productive with clear mucous. He notes sinus drainage which is often worse in the Spring months. He notes occasional wheezing with allergy triggers. He denies any fevers or chills. He does not have an inhaler. He denies prior diagnosis of Asthma. He states he has had an intermittent wheeze for almost a year now.       Echo 4/11/2017 indicated;    Technically difficult but adequate study for interpretation.   3 ml Optison contrast was used to enhance definition of the endocardial   borders.   Normal left ventricular size  "and systolic function. Mild concentric   left ventricular hypertrophy. Left ventricular ejection fraction is   visually estimated to be 60%. Diastolic function is difficult to assess   with atrial fibrillation.   Moderately dilated left atrium.  Compared to the report of the study done 7/2014 - there has been an   improvement in LVEF and estimated RVSP.     Past Medical History   Diagnosis Date   • AF (atrial fibrillation) (CMS-HCC) 6/24/2010   • Alcohol abuse    • Chronic anticoagulation    • Atrial fibrillation (CMS-HCC) July 11, 2014     AF with RVR   • Infectious disease    • Hypertension    • Arthritis      \"rhematoid eyes\"   • Snoring    • JACE (obstructive sleep apnea) 6/24/2010     uses CPAP   • Sleep apnea    • Other emphysema (CMS-HCC)    • Cold 6/25/14   • Pneumonia 6/25/14     ?   • Bronchitis 6/25/14     ?   • PAF (paroxysmal atrial fibrillation) (CMS-HCC) 10/1/2014   • Obesity    • Chickenpox    • Tonsillitis    • Eye pain    • Hearing difficulty    • Wheezing        Past Surgical History   Procedure Laterality Date   • Recovery  8/7/2014     Performed by Cath-Recovery Surgery at SURGERY SAME DAY [facility]       Family History   Problem Relation Age of Onset   • Hypertension Mother        Social History     Social History   • Marital Status:      Spouse Name: N/A   • Number of Children: N/A   • Years of Education: N/A     Occupational History   • Not on file.     Social History Main Topics   • Smoking status: Former Smoker -- 0.50 packs/day for 20 years     Types: Cigarettes     Quit date: 01/01/1998   • Smokeless tobacco: Never Used   • Alcohol Use: 5.4 oz/week     3 Shots of liquor, 6 Glasses of wine per week      Comment: around 6 glasses of wine every day and 2-3 shots of tequilla   • Drug Use: Yes     Special: Marijuana      Comment: Marijuana every 2 weeks   • Sexual Activity: Not on file     Other Topics Concern   • Not on file     Social History Narrative       ROS:  Constitutional: " "Denies fevers, chills, sweats, fatigue, weight loss  Eyes: Denies glasses, vision loss, pain, drainage, double vision  Ears/Nose/Mouth/Throat: Denies ear ache, difficulty hearing, sore throat, persistent hoarseness, decayed teeth/toothache  Cardiovascular: Denies chest pain, tightness, palpitations, swelling in feet/legs, fainting, difficulty breathing when laying down  Respiratory: See HPI   GI: Denies heartburn, difficulty swallowing, nausea, vomiting, abdominal pain, diarrhea, constipation  : Denies frequent urination, painful urination  Integumentary: Denies rashes, lumps or color changes  MSK: Denies painful joints, sore muscles, and back pain.   Neurological: Denies frequent headaches, dizziness, weakness  Sleep: See HPI     Current Outpatient Prescriptions on File Prior to Visit   Medication Sig Dispense Refill   • rivaroxaban (XARELTO) 20 MG Tab tablet Take 1 Tab by mouth with dinner. 30 Tab 11   • metoprolol SR (TOPROL XL) 100 MG TABLET SR 24 HR Take 1 Tab by mouth every day. 30 Tab 11     No current facility-administered medications on file prior to visit.     Review of patient's allergies indicates no known allergies.    Blood pressure 148/90, pulse 86, resp. rate 16, height 1.753 m (5' 9\"), weight 122.018 kg (269 lb), SpO2 93 %.  PE:   Appearance: Well developed, well nourished, no acute distress  Eyes: PERRL, EOM intact, sclera white, conjunctiva moist  Ears: no lesions or deformities  Hearing: grossly intact  Nose: no lesions or deformities  Oropharynx: tongue normal, posterior pharynx without erythema or exudate  Mallampati Classification: class 4  Neck: supple, trachea midline, no masses   Respiratory effort: no intercostal retractions or use of accessory muscles  Lung auscultation: no rales, rhonchi or wheezes  Heart auscultation: no murmur rub or gallop  Extremities: no cyanosis or edema  Abdomen: soft ,non tender, no masses  Gait and Station: normal  Digits and nails: no clubbing, cyanosis, " petechiae or nodes.  Cranial nerves: grossly intact  Skin: no rashes, lesions or ulcers noted  Orientation: Oriented to time, person and place  Mood and affect: mood and affect appropriate, normal interaction with examiner  Judgement: Intact          Assessment:  1. JACE (obstructive sleep apnea)  DME CPAP    DME [facility]   2. BMI 39.0-39.9,adult     3. Persistent atrial fibrillation (CMS-HCC)     4. Chronic anticoagulation     5. Wheezing  AMB PULMONARY FUNCTION TEST/LAB    DX-CHEST-2 VIEWS   6. Allergic rhinitis, unspecified allergic rhinitis trigger, unspecified rhinitis seasonality           Plan:    1) Order for new Auto CPAP machine at 10-16 CM H20. Overnight oximetry through his DME to ensure adequate saturations on this setting. 6 week follow up with compliance card download.   2) Sleep hygiene discussed. Weight loss recommended.  3) Continue to follow with Cardiology.   4) He has complaints of intermittent wheezing for over a year, cough and 2 episodes of bronchitis. Query underlying Asthma. Order for chest xray and PFT's to be completed at his follow up visit. Consider trial of CLINTON.   5) Follow up in 6 weeks at the Pulmonary clinic with [facility], compliance card download, chest xray and PFT's, sooner OV if needed.

## 2017-06-27 NOTE — MR AVS SNAPSHOT
"        Jude Jiménez   2017 8:00 AM   Sleep Center Visit   MRN: [id number]    Department:  [facility]   Dept Phone:  [phone]    Description:  Male : 1960   Provider:  TOMASA Macias           Reason for Visit     Establish Care Previous PMA patient       Allergies as of 2017     No Known Allergies      You were diagnosed with     JACE (obstructive sleep apnea)   [519173]       BMI 39.0-39.9,adult   [181075]       Persistent atrial fibrillation (CMS-HCC)   [524160]       Chronic anticoagulation   [028496]       Wheezing   [786.07.ICD-9-CM]       Allergic rhinitis, unspecified allergic rhinitis trigger, unspecified rhinitis seasonality   [2782519]         Vital Signs     Blood Pressure Pulse Respirations Height Weight Body Mass Index    148/90 mmHg 86 16 1.753 m (5' 9\") 122.018 kg (269 lb) 39.71 kg/m2    Oxygen Saturation Smoking Status                93% Former Smoker          Basic Information     Date Of Birth Sex Race Ethnicity Preferred Language    1960 Male White Non- English      Your appointments     2017  8:00 AM   Pulmonary Function Test with PFT-RM3   [facility] (--)    [address]  [address]  [place] [id number] [phone]            2017  7:40 AM   FOLLOW UP with Soy Vides M.D.   [facility] (--)    [address], [address]  [place] [id number] [phone]            Aug 07, 2017  8:40 AM   Established Patient Pul with SHRAVAN MaciasPJUAN   [facility] (--)    [address]  [address]  [place] [id number] [phone]              Problem List              ICD-10-CM Priority Class Noted - Resolved    JACE (obstructive sleep apnea) (Chronic) G47.33 High  2010 - Present    Iritis H20.9   3/17/2011 - Present    Obesity E66.9   2013 - Present    Alcohol abuse F10.10 High  Unknown - Present    Chronic anticoagulation " Z79.01 Medium  Unknown - Present    IGT (impaired glucose tolerance) R73.02   11/10/2016 - Present    Persistent atrial fibrillation (CMS-HCC) I48.1   5/19/2017 - Present      Health Maintenance        Date Due Completion Dates    IMM DTaP/Tdap/Td Vaccine (1 - Tdap) 9/9/1978 ---    COLON CANCER SCREENING ANNUAL FIT 3/20/2017 3/20/2016            Current Immunizations     Influenza Vaccine Quad Inj (Pf) 11/10/2016    Pneumococcal polysaccharide vaccine (PPSV-23) 9/23/2015      Below and/or attached are the medications your provider expects you to take. Review all of your home medications and newly ordered medications with your provider and/or pharmacist. Follow medication instructions as directed by your provider and/or pharmacist. Please keep your medication list with you and share with your provider. Update the information when medications are discontinued, doses are changed, or new medications (including over-the-counter products) are added; and carry medication information at all times in the event of emergency situations     Allergies:  No Known Allergies          Medications  Valid as of: June 27, 2017 -  8:58 AM    Generic Name Brand Name Tablet Size Instructions for use    Metoprolol Succinate (TABLET SR 24 HR) TOPROL  MG Take 1 Tab by mouth every day.        Rivaroxaban (Tab) XARELTO 20 MG Take 1 Tab by mouth with dinner.        .                 Medicines prescribed today were sent to:     [facility] [address] [place] [address] [address]     [address] [facility] [address]    Phone: [phone] Fax: [phone]    Open 24 Hours?: No      Medication refill instructions:       If your prescription bottle indicates you have medication refills left, it is not necessary to call your provider’s office. Please contact your pharmacy and they will refill your medication.    If your prescription bottle indicates you do not have any refills left, you may request refills at any time through one of the following  ways: The online [employer] system (except Urgent Care), by calling your provider’s office, or by asking your pharmacy to contact your provider’s office with a refill request. Medication refills are processed only during regular business hours and may not be available until the next business day. Your provider may request additional information or to have a follow-up visit with you prior to refilling your medication.   *Please Note: Medication refills are assigned a new Rx number when refilled electronically. Your pharmacy may indicate that no refills were authorized even though a new prescription for the same medication is available at the pharmacy. Please request the medicine by name with the pharmacy before contacting your provider for a refill.        Your To Do List     Future Labs/Procedures Complete By Expires    AMB PULMONARY FUNCTION TEST/LAB  8/10/2017 (Approximate) 6/27/2018    Comments:    In 6 weeks at follow up visit    DX-CHEST-2 VIEWS  8/10/2017 (Approximate) 6/27/2018    Scheduling Instructions:    When: in 6 weeks at follow up visit   Where: Pulmonary clinic      Instructions    Plan:    1) Order for new Auto CPAP machine at 10-16 CM H20. Overnight oximetry through his DME to ensure adequate saturations on this setting. 6 week follow up with compliance card download.   2) Sleep hygiene discussed. Weight loss recommended.  3) Continue to follow with Cardiology.   4) He has complaints of intermittent wheezing for over a year, cough and 2 episodes of bronchitis. Query underlying Asthma. Order for chest xray and PFT's to be completed at his follow up visit. Consider trial of CLINTON.   5) Follow up in 6 weeks at the Pulmonary clinic with [facility], compliance card download, chest xray and PFT's, sooner OV if needed.           [employer] Access Code: [email or web address]  Expires: 7/13/2017  8:02 AM    [employer]  A secure, online tool to manage your health information     [employer]’s [employer]® is a secure, online tool  that connects you to your personalized health information from the privacy of your home -- day or night - making it very easy for you to manage your healthcare. Once the activation process is completed, you can even access your medical information using the [employer] reena, which is available for free in the Apple Reena store or Google Play store.     [employer] provides the following levels of access (as shown below):   My Chart Features   [facility] Primary Care Doctor [facility]  Specialists [facility]  Urgent  Care Non-[facility]  Primary Care  Doctor   Email your healthcare team securely and privately 24/7 X X X    Manage appointments: schedule your next appointment; view details of past/upcoming appointments X      Request prescription refills. X      View recent personal medical records, including lab and immunizations X X X X   View health record, including health history, allergies, medications X X X X   Read reports about your outpatient visits, procedures, consult and ER notes X X X X   See your discharge summary, which is a recap of your hospital and/or ER visit that includes your diagnosis, lab results, and care plan. X X       How to register for [employer]:  1. Go to  [email or web address].  2. Click on the Sign Up Now box, which takes you to the New Member Sign Up page. You will need to provide the following information:  a. Enter your [employer] Access Code exactly as it appears at the top of this page. (You will not need to use this code after you’ve completed the sign-up process. If you do not sign up before the expiration date, you must request a new code.)   b. Enter your date of birth.   c. Enter your home email address.   d. Click Submit, and follow the next screen’s instructions.  3. Create a [employer] ID. This will be your [employer] login ID and cannot be changed, so think of one that is secure and easy to remember.  4. Create a [employer] password. You can change your password at any time.  5. Enter your Password Reset  Question and Answer. This can be used at a later time if you forget your password.   6. Enter your e-mail address. This allows you to receive e-mail notifications when new information is available in [employer].  7. Click Sign Up. You can now view your health information.    For assistance activating your [employer] account, call [phone]

## 2017-06-27 NOTE — PATIENT INSTRUCTIONS
Plan:    1) Order for new Auto CPAP machine at 10-16 CM H20. Overnight oximetry through his DME to ensure adequate saturations on this setting. 6 week follow up with compliance card download.   2) Sleep hygiene discussed. Weight loss recommended.  3) Continue to follow with Cardiology.   4) He has complaints of intermittent wheezing for over a year, cough and 2 episodes of bronchitis. Query underlying Asthma. Order for chest xray and PFT's to be completed at his follow up visit. Consider trial of CLINTON.   5) Follow up in 6 weeks at the Pulmonary clinic with [facility], compliance card download, chest xray and PFT's, sooner OV if needed.

## 2017-07-20 ENCOUNTER — NON-PROVIDER VISIT (OUTPATIENT)
Dept: PULMONOLOGY | Facility: HOSPICE | Age: 57
End: 2017-07-20
Payer: COMMERCIAL

## 2017-07-20 VITALS — HEIGHT: 67 IN | BODY MASS INDEX: 41.59 KG/M2 | WEIGHT: 265 LBS

## 2017-07-20 DIAGNOSIS — R06.2 WHEEZING: ICD-10-CM

## 2017-07-20 PROCEDURE — 94726 PLETHYSMOGRAPHY LUNG VOLUMES: CPT | Performed by: INTERNAL MEDICINE

## 2017-07-20 PROCEDURE — 94060 EVALUATION OF WHEEZING: CPT | Performed by: INTERNAL MEDICINE

## 2017-07-20 PROCEDURE — 94729 DIFFUSING CAPACITY: CPT | Performed by: INTERNAL MEDICINE

## 2017-07-20 ASSESSMENT — PULMONARY FUNCTION TESTS
FEV1_PERCENT_CHANGE: 27
FVC_PREDICTED: 4.42
FVC: 3.26
FVC_PERCENT_PREDICTED: 73
FEV1_PERCENT_PREDICTED: 52
FEV1_PERCENT_PREDICTED: 67
FEV1/FVC: 69.33
FEV1/FVC: 59
FEV1_PERCENT_CHANGE: 8
FEV1/FVC_PERCENT_PREDICTED: 92
FVC: 2.99
FEV1/FVC_PERCENT_CHANGE: 338
FEV1: 1.77
FEV1/FVC_PERCENT_PREDICTED: 76
FEV1/FVC_PERCENT_PREDICTED: 78
FVC_PERCENT_PREDICTED: 67
FEV1_PREDICTED: 3.37
FEV1: 2.26

## 2017-07-20 NOTE — MR AVS SNAPSHOT
"        Jude Jiménez   2017 8:00 AM   Non-Provider Visit   MRN: [id number]    Department:  [facility]   Dept Phone:  [phone]    Description:  Male : 1960   Provider:  MESSI           Reason for Visit     Shortness of Breath           Allergies as of 2017     No Known Allergies      You were diagnosed with     Wheezing   [786.07.ICD-9-CM]         Vital Signs     Height Weight Body Mass Index Smoking Status          1.714 m (5' 7.48\") 120.203 kg (265 lb) 40.92 kg/m2 Former Smoker        Basic Information     Date Of Birth Sex Race Ethnicity Preferred Language    1960 Male White Non- English      Your appointments     2017  7:40 AM   FOLLOW UP with Soy Vides M.D.   [facility] (--)    [address], [address]  [place] [id number] [phone]            Aug 07, 2017  8:40 AM   Established Patient Pul with TOMASA Macias   [facility] (--)    [address]  [address]  [place] [id number] [phone]              Problem List              ICD-10-CM Priority Class Noted - Resolved    JACE (obstructive sleep apnea) (Chronic) G47.33 High  2010 - Present    Iritis H20.9   3/17/2011 - Present    Obesity E66.9   2013 - Present    Alcohol abuse F10.10 High  Unknown - Present    Chronic anticoagulation Z79.01 Medium  Unknown - Present    IGT (impaired glucose tolerance) R73.02   11/10/2016 - Present    Persistent atrial fibrillation (CMS-HCC) I48.1   2017 - Present      Health Maintenance        Date Due Completion Dates    IMM DTaP/Tdap/Td Vaccine (1 - Tdap) 1978 ---    COLONOSCOPY 2009 ---    COLON CANCER SCREENING ANNUAL FIT 3/20/2017 3/20/2016    IMM INFLUENZA (1) 2017 11/10/2016            Current Immunizations     Influenza Vaccine Quad Inj (Pf) 11/10/2016    Pneumococcal polysaccharide vaccine (PPSV-23) 2015      Below and/or attached are the " medications your provider expects you to take. Review all of your home medications and newly ordered medications with your provider and/or pharmacist. Follow medication instructions as directed by your provider and/or pharmacist. Please keep your medication list with you and share with your provider. Update the information when medications are discontinued, doses are changed, or new medications (including over-the-counter products) are added; and carry medication information at all times in the event of emergency situations     Allergies:  No Known Allergies          Medications  Valid as of: July 20, 2017 -  8:32 AM    Generic Name Brand Name Tablet Size Instructions for use    Metoprolol Succinate (TABLET SR 24 HR) TOPROL  MG Take 1 Tab by mouth every day.        Rivaroxaban (Tab) XARELTO 20 MG Take 1 Tab by mouth with dinner.        .                 Medicines prescribed today were sent to:     [facility] [address] [place] [address] - [address]    [address] [facility] [address]    Phone: [phone] Fax: [phone]    Open 24 Hours?: No      Medication refill instructions:       If your prescription bottle indicates you have medication refills left, it is not necessary to call your provider’s office. Please contact your pharmacy and they will refill your medication.    If your prescription bottle indicates you do not have any refills left, you may request refills at any time through one of the following ways: The online [employer] system (except Urgent Care), by calling your provider’s office, or by asking your pharmacy to contact your provider’s office with a refill request. Medication refills are processed only during regular business hours and may not be available until the next business day. Your provider may request additional information or to have a follow-up visit with you prior to refilling your medication.   *Please Note: Medication refills are assigned a new Rx number when refilled electronically.  Your pharmacy may indicate that no refills were authorized even though a new prescription for the same medication is available at the pharmacy. Please request the medicine by name with the pharmacy before contacting your provider for a refill.           [employer] Access Code: [email or web address]  Expires: 8/19/2017  8:32 AM    Your email address is not on file at [employer].  Email Addresses are required for you to sign up for [employer], please contact [phone] to verify your personal information and to provide your email address prior to attempting to register for [employer].    Jude Jiménez  [address]  [place] [address]    [employer]  A secure, online tool to manage your health information     [employer]’s [employer]® is a secure, online tool that connects you to your personalized health information from the privacy of your home -- day or night - making it very easy for you to manage your healthcare. Once the activation process is completed, you can even access your medical information using the [employer] reena, which is available for free in the Apple Reena store or Google Play store.     To learn more about [employer], visit [email or web address]    There are two levels of access available (as shown below):   My Chart Features  [facility] Primary Care Doctor [facility]  Specialists [facility]  Urgent  Care Non-[facility] Primary Care Doctor   Email your healthcare team securely and privately 24/7 X X X    Manage appointments: schedule your next appointment; view details of past/upcoming appointments X      Request prescription refills. X      View recent personal medical records, including lab and immunizations X X X X   View health record, including health history, allergies, medications X X X X   Read reports about your outpatient visits, procedures, consult and ER notes X X X X   See your discharge summary, which is a recap of your hospital and/or ER visit that includes your diagnosis, lab results, and care plan X X   X     How to register for [employer]:  Once your e-mail address has been verified, follow the following steps to sign up for [employer].     1. Go to  [email or web address]  2. Click on the Sign Up Now box, which takes you to the New Member Sign Up page. You will need to provide the following information:  a. Enter your [employer] Access Code exactly as it appears at the top of this page. (You will not need to use this code after you’ve completed the sign-up process. If you do not sign up before the expiration date, you must request a new code.)   b. Enter your date of birth.   c. Enter your home email address.   d. Click Submit, and follow the next screen’s instructions.  3. Create a [employer] ID. This will be your [employer] login ID and cannot be changed, so think of one that is secure and easy to remember.  4. Create a [employer] password. You can change your password at any time.  5. Enter your Password Reset Question and Answer. This can be used at a later time if you forget your password.   6. Enter your e-mail address. This allows you to receive e-mail notifications when new information is available in [employer].  7. Click Sign Up. You can now view your health information.    For assistance activating your [employer] account, call [phone]

## 2017-07-20 NOTE — PROCEDURES
Technician: CARMEN Dodge    Technician Comment:  Good patient effort & cooperation.  The results of this test meet the ATS/ERS standards for acceptability & reproducibility.  Test was performed on the [employer] Plethysmograph-Elite DX system.  Predicted values were [facility]-3 for spirometry, [facility] for DLCO, ITS for Lung Volumes.  The DLCO was uncorrected for Hgb.  A bronchodilator of Ventolin HFA -2puffs via spacer administered.      The FVC is 3.26 L or 73%, FEV1 is 2.26 L or 67%, FEV1/FVC 69%. %. Increased RV. DLCO 140%. Significant reversibility noted.    Interpretation:  PFT show moderately severe obstructive ventilatory defect with significant bronchodilator response consistent with asthma.  Interpretation:

## 2017-07-28 ENCOUNTER — HOSPITAL ENCOUNTER (OUTPATIENT)
Dept: RADIOLOGY | Facility: MEDICAL CENTER | Age: 57
End: 2017-07-28
Attending: NURSE PRACTITIONER
Payer: COMMERCIAL

## 2017-07-28 DIAGNOSIS — R06.2 WHEEZING: ICD-10-CM

## 2017-07-28 PROCEDURE — 71020 DX-CHEST-2 VIEWS: CPT

## 2017-08-07 ENCOUNTER — APPOINTMENT (OUTPATIENT)
Dept: PULMONOLOGY | Facility: HOSPICE | Age: 57
End: 2017-08-07
Payer: COMMERCIAL

## 2017-08-23 ENCOUNTER — TELEPHONE (OUTPATIENT)
Dept: PULMONOLOGY | Facility: HOSPICE | Age: 57
End: 2017-08-23

## 2017-08-24 ENCOUNTER — OFFICE VISIT (OUTPATIENT)
Dept: PULMONOLOGY | Facility: HOSPICE | Age: 57
End: 2017-08-24
Payer: COMMERCIAL

## 2017-08-24 VITALS
HEIGHT: 69 IN | RESPIRATION RATE: 16 BRPM | SYSTOLIC BLOOD PRESSURE: 148 MMHG | WEIGHT: 272 LBS | HEART RATE: 82 BPM | OXYGEN SATURATION: 93 % | DIASTOLIC BLOOD PRESSURE: 92 MMHG | BODY MASS INDEX: 40.29 KG/M2

## 2017-08-24 DIAGNOSIS — J45.909 UNCOMPLICATED ASTHMA, UNSPECIFIED ASTHMA SEVERITY: ICD-10-CM

## 2017-08-24 DIAGNOSIS — Z79.01 CHRONIC ANTICOAGULATION: ICD-10-CM

## 2017-08-24 DIAGNOSIS — J30.9 ALLERGIC RHINITIS, UNSPECIFIED ALLERGIC RHINITIS TRIGGER, UNSPECIFIED RHINITIS SEASONALITY: ICD-10-CM

## 2017-08-24 DIAGNOSIS — G47.33 OSA (OBSTRUCTIVE SLEEP APNEA): Chronic | ICD-10-CM

## 2017-08-24 PROCEDURE — 99214 OFFICE O/P EST MOD 30 MIN: CPT | Performed by: NURSE PRACTITIONER

## 2017-08-24 RX ORDER — ALBUTEROL SULFATE 90 UG/1
2 AEROSOL, METERED RESPIRATORY (INHALATION) EVERY 4 HOURS PRN
Qty: 1 INHALER | Refills: 11 | Status: SHIPPED | OUTPATIENT
Start: 2017-08-24 | End: 2017-10-23

## 2017-08-24 NOTE — PATIENT INSTRUCTIONS
Plan:    1) Reviewed PFT's in detail. Discussed diagnosis of Asthma. Trial of Breo 100/25 mcg 1 puff INH daily, rinse mouth after use. Continue Proair HFA inhaler 2 puffs INH qid prn.   2) Continue Auto CPAP 10-16 CM H20. He is using his machine nightly and benefiting from use. Pending results of his CNOX from his DME.   3) Weight loss recommended.  4) Discussed importance of routine exercise.  5) He is up to date on Pneumovax 23 vaccination. Recommend an updated Influenza vaccine in the Fall.  6) Follow up in 2 months to discuss response to Breo and to review overnight oximetry, sooner OV if needed.

## 2017-08-24 NOTE — TELEPHONE ENCOUNTER
I called and left a message for this patient to see if he would like to reschedule his appointment with us. Patient was set up for his CPAP on 7/28/17 and cannot have an OPO completed until 8/28/17 or later as well as his first compliance completed after 30 days as well. PFT's were already completed on 7/20/17 and results can be discussed at that time if patient would like to change his appointment for after OPO and due date of first compliance.

## 2017-08-24 NOTE — PROGRESS NOTES
Chief Complaint   Patient presents with   • Apnea     CPAP 10-16 CM H2O       HPI:  Jude Jiménez is a 56 y.o. year old male here today for follow-up on his PFT's, chest xray and compliance card download. He was seen 6/27/2017 to re-establish care for his obstructive sleep apnea. He was referred back to our office by Cardiology for a reevaluation of his sleep apnea as he had recurrent atrial fibrillation. He was previously seen in our office in April 2008. Prior sleep study indicated severe obstructive sleep apnea with an RDI of 96.9. He had been on CPAP at 12 CM H20. His old machine did not have AHI capability. He was ordered a new machine with Auto CPAP 10-16 CM H20. His compliance card download indicates an AHI of 4.9 with an average use of 7-8 hours at night. Overnight oximetry was ordered through his DME. However, has not been completed. He tolerates the pressure well. He has had some mask comfort issues. He notes occasional mask leaks. He does feel he sleeps better on therapy. He does feel he wakes more refreshed on therapy. He denies any morning headaches.     He states he has had 2 episodes of bronchitis this past year. He states his PCP has treated him with antibiotics. He has a 10 PYH of tobacco abuse. He quit smoking in 1998. He states his last respiratory infection was over 8 months ago. He is not on inhalers currently. He notes mild dyspnea with exercise. He feels he recovers well with rest. He notes an occasional cough in the morning which is productive with clear mucous. He notes sinus drainage which is often worse in the Spring months. He notes occasional wheezing with allergy triggers. He denies any fevers or chills. He does not have an inhaler. He denies prior diagnosis of Asthma. He states he has had an intermittent wheeze for almost a year now.      Chest xray was completed 7/28/2017 and indicates clear lungs.     PFT's 7/20/2017 indicated an FEV1 of 1.77 L, 52% predicted with an FEV1/FVC  "ratio of 59 with a positive bronchodilator response and a DLCO of 140% predicted.       Echo 4/11/2017 indicated;    Technically difficult but adequate study for interpretation.   3 ml Optison contrast was used to enhance definition of the endocardial   borders.   Normal left ventricular size and systolic function. Mild concentric   left ventricular hypertrophy. Left ventricular ejection fraction is   visually estimated to be 60%. Diastolic function is difficult to assess   with atrial fibrillation.   Moderately dilated left atrium.  Compared to the report of the study done 7/2014 - there has been an   improvement in LVEF and estimated RVSP.       Past Medical History   Diagnosis Date   • AF (atrial fibrillation) (CMS-HCC) 6/24/2010   • Alcohol abuse    • Chronic anticoagulation    • Atrial fibrillation (CMS-HCC) July 11, 2014     AF with RVR   • Infectious disease    • Hypertension    • Arthritis      \"rhematoid eyes\"   • Snoring    • JACE (obstructive sleep apnea) 6/24/2010     uses CPAP   • Sleep apnea    • Other emphysema (CMS-HCC)    • Cold 6/25/14   • Pneumonia 6/25/14     ?   • Bronchitis 6/25/14     ?   • PAF (paroxysmal atrial fibrillation) (CMS-HCC) 10/1/2014   • Obesity    • Chickenpox    • Tonsillitis    • Eye pain    • Hearing difficulty    • Wheezing        Past Surgical History   Procedure Laterality Date   • Recovery  8/7/2014     Performed by Cath-Recovery Surgery at SURGERY SAME DAY [facility]       Family History   Problem Relation Age of Onset   • Hypertension Mother        Social History     Social History   • Marital Status:      Spouse Name: N/A   • Number of Children: N/A   • Years of Education: N/A     Occupational History   • Not on file.     Social History Main Topics   • Smoking status: Former Smoker -- 0.50 packs/day for 20 years     Types: Cigarettes     Quit date: 01/01/1998   • Smokeless tobacco: Never Used   • Alcohol Use: 5.4 oz/week     6 Glasses of wine, 3 Shots of liquor " "per week      Comment: around 6 glasses of wine every day and 2-3 shots of tequilla   • Drug Use: Yes     Special: Marijuana      Comment: Marijuana every 2 weeks   • Sexual Activity: Not on file     Other Topics Concern   • Not on file     Social History Narrative       ROS:  Constitutional: Denies fevers, chills, sweats, fatigue, weight loss  Eyes: Denies glasses, vision loss, pain, drainage, double vision  Ears/Nose/Mouth/Throat: Denies ear ache, difficulty hearing, sore throat, persistent hoarseness, decayed teeth/toothache  Cardiovascular: Denies chest pain, tightness, palpitations, swelling in feet/legs, fainting, difficulty breathing when laying down  Respiratory: See HPI   GI: Denies heartburn, difficulty swallowing, nausea, vomiting, abdominal pain, diarrhea, constipation  : Denies frequent urination, painful urination  Integumentary: Denies rashes, lumps or color changes  MSK: Denies painful joints, sore muscles, and back pain.   Neurological: Denies frequent headaches, dizziness, weakness  Sleep: See HPI       Current Outpatient Prescriptions on File Prior to Visit   Medication Sig Dispense Refill   • rivaroxaban (XARELTO) 20 MG Tab tablet Take 1 Tab by mouth with dinner. 30 Tab 11   • metoprolol SR (TOPROL XL) 100 MG TABLET SR 24 HR Take 1 Tab by mouth every day. 30 Tab 11     No current facility-administered medications on file prior to visit.     Review of patient's allergies indicates no known allergies.    Blood pressure 148/92, pulse 82, resp. rate 16, height 1.753 m (5' 9.02\"), weight 123.378 kg (272 lb), SpO2 93 %.  PE:   Appearance: Well developed, well nourished, no acute distress  Eyes: PERRL, EOM intact, sclera white, conjunctiva moist  Ears: no lesions or deformities  Hearing: grossly intact  Nose: no lesions or deformities  Oropharynx: tongue normal, posterior pharynx without erythema or exudate  Neck: supple, trachea midline, no masses   Respiratory effort: no intercostal retractions or " use of accessory muscles  Lung auscultation: faint scattered wheezing   Heart auscultation: no murmur rub or gallop  Extremities: no cyanosis or edema  Abdomen: soft ,non tender, no masses  Gait and Station: normal  Digits and nails: no clubbing, cyanosis, petechiae or nodes.  Cranial nerves: grossly intact  Skin: no rashes, lesions or ulcers noted  Orientation: Oriented to time, person and place  Mood and affect: mood and affect appropriate, normal interaction with examiner  Judgement: Intact          Assessment:  1. JACE (obstructive sleep apnea)     2. BMI 40.0-44.9, adult (CMS-[facility])     3. Chronic anticoagulation     4. Allergic rhinitis, unspecified allergic rhinitis trigger, unspecified rhinitis seasonality     5. Uncomplicated asthma, unspecified asthma severity  Fluticasone Furoate-Vilanterol (BREO ELLIPTA) 100-25 MCG/INH AEROSOL POWDER, BREATH ACTIVATED    albuterol (PROAIR HFA) 108 (90 Base) MCG/ACT Aero Soln inhalation aerosol         Plan:    1) Reviewed PFT's in detail. Discussed diagnosis of Asthma. Trial of Breo 100/25 mcg 1 puff INH daily, rinse mouth after use. Continue Proair HFA inhaler 2 puffs INH qid prn.   2) Continue Auto CPAP 10-16 CM H20. He is using his machine nightly and benefiting from use. Pending results of his CNOX from his DME.   3) Weight loss recommended.  4) Discussed importance of routine exercise.  5) He is up to date on Pneumovax 23 vaccination. Recommend an updated Influenza vaccine in the Fall.  6) Follow up in 2 months to discuss response to Breo and to review overnight oximetry, sooner OV if needed.

## 2017-08-24 NOTE — MR AVS SNAPSHOT
"        Jude Jiménez   2017 8:00 AM   Office Visit   MRN: [id number]    Department:  [facility]   Dept Phone:  [phone]    Description:  Male : 1960   Provider:  TOMASA Macias           Reason for Visit     Apnea CPAP 10-16 CM H2O      Allergies as of 2017     No Known Allergies      You were diagnosed with     JACE (obstructive sleep apnea)   [421254]       BMI 40.0-44.9, adult (CMS-HCC)   [658656]       Chronic anticoagulation   [501442]       Allergic rhinitis, unspecified allergic rhinitis trigger, unspecified rhinitis seasonality   [5319579]       Uncomplicated asthma, unspecified asthma severity   [5774323]         Vital Signs     Blood Pressure Pulse Respirations Height Weight Body Mass Index    148/92 mmHg 82 16 1.753 m (5' 9.02\") 123.378 kg (272 lb) 40.15 kg/m2    Oxygen Saturation Smoking Status                93% Former Smoker          Basic Information     Date Of Birth Sex Race Ethnicity Preferred Language    1960 Male White Non- English      Your appointments     Oct 23, 2017  1:40 PM   Established Patient Pul with TOMASA Macias   [facility] (--)    [address] [place] [address] [address]  [facility] [address] [phone]              Problem List              ICD-10-CM Priority Class Noted - Resolved    JACE (obstructive sleep apnea) (Chronic) G47.33 High  2010 - Present    Iritis H20.9   3/17/2011 - Present    Obesity E66.9   2013 - Present    Alcohol abuse F10.10 High  Unknown - Present    Chronic anticoagulation Z79.01 Medium  Unknown - Present    IGT (impaired glucose tolerance) R73.02   11/10/2016 - Present    Persistent atrial fibrillation (CMS-HCC) I48.1   2017 - Present    BMI 40.0-44.9, adult (CMS-HCC) Z68.41   2017 - Present      Health Maintenance        Date Due Completion Dates    IMM DTaP/Tdap/Td Vaccine (1 - Tdap) 1978 ---    COLON CANCER SCREENING ANNUAL FIT 3/20/2017 3/20/2016 "    IMM INFLUENZA (1) 9/1/2017 11/10/2016            Current Immunizations     Influenza Vaccine Quad Inj (Pf) 11/10/2016    Pneumococcal polysaccharide vaccine (PPSV-23) 9/23/2015      Below and/or attached are the medications your provider expects you to take. Review all of your home medications and newly ordered medications with your provider and/or pharmacist. Follow medication instructions as directed by your provider and/or pharmacist. Please keep your medication list with you and share with your provider. Update the information when medications are discontinued, doses are changed, or new medications (including over-the-counter products) are added; and carry medication information at all times in the event of emergency situations     Allergies:  No Known Allergies          Medications  Valid as of: August 24, 2017 -  8:41 AM    Generic Name Brand Name Tablet Size Instructions for use    Albuterol Sulfate (Aero Soln) albuterol 108 (90 Base) MCG/ACT Inhale 2 Puffs by mouth every four hours as needed for Shortness of Breath (wheezing).        Fluticasone Furoate-Vilanterol (AEROSOL POWDER, BREATH ACTIVATED) Fluticasone Furoate-Vilanterol 100-25 MCG/INH Take 1 Inhalation by mouth every day. Rinse mouth after use.        Metoprolol Succinate (TABLET SR 24 HR) TOPROL  MG Take 1 Tab by mouth every day.        Rivaroxaban (Tab) XARELTO 20 MG Take 1 Tab by mouth with dinner.        .                 Medicines prescribed today were sent to:     [facility] [address] [place] [address] [address]     [address] [place] [address]    Phone: [phone] Fax: [phone]    Open 24 Hours?: No      Medication refill instructions:       If your prescription bottle indicates you have medication refills left, it is not necessary to call your provider’s office. Please contact your pharmacy and they will refill your medication.    If your prescription bottle indicates you do not have any refills left, you may request refills at  any time through one of the following ways: The online Memrise system (except Urgent Care), by calling your provider’s office, or by asking your pharmacy to contact your provider’s office with a refill request. Medication refills are processed only during regular business hours and may not be available until the next business day. Your provider may request additional information or to have a follow-up visit with you prior to refilling your medication.   *Please Note: Medication refills are assigned a new Rx number when refilled electronically. Your pharmacy may indicate that no refills were authorized even though a new prescription for the same medication is available at the pharmacy. Please request the medicine by name with the pharmacy before contacting your provider for a refill.        Instructions    Plan:    1) Reviewed PFT's in detail. Discussed diagnosis of Asthma. Trial of Breo 100/25 mcg 1 puff INH daily, rinse mouth after use. Continue Proair HFA inhaler 2 puffs INH qid prn.   2) Continue Auto CPAP 10-16 CM H20. He is using his machine nightly and benefiting from use. Pending results of his CNOX from his DME.   3) Weight loss recommended.  4) Discussed importance of routine exercise.  5) He is up to date on Pneumovax 23 vaccination. Recommend an updated Influenza vaccine in the Fall.  6) Follow up in 2 months to discuss response to Breo and to review overnight oximetry, sooner OV if needed.           Memrise Access Code: [email or web address]  Expires: 9/22/2017  4:13 AM    Your email address is not on file at [facility].  Email Addresses are required for you to sign up for Memrise, please contact [phone] to verify your personal information and to provide your email address prior to attempting to register for Memrise.    Jude Jiménez  [address] [facility] [address]  [place] [address]    Memrise  A secure, online tool to manage your health information     [facility]’s Memrise® is a secure, online  tool that connects you to your personalized health information from the privacy of your home -- day or night - making it very easy for you to manage your healthcare. Once the activation process is completed, you can even access your medical information using the Edufii reena, which is available for free in the Apple Reena store or Google Play store.     To learn more about Edufii, visit [email or web address]    There are two levels of access available (as shown below):   My Chart Features  [facility] Doctor [facility]  Specialists [facility]  Urgent  Care Non-[facility] Primary Care Doctor   Email your healthcare team securely and privately 24/7 X X X    Manage appointments: schedule your next appointment; view details of past/upcoming appointments X      Request prescription refills. X      View recent personal medical records, including lab and immunizations X X X X   View health record, including health history, allergies, medications X X X X   Read reports about your outpatient visits, procedures, consult and ER notes X X X X   See your discharge summary, which is a recap of your hospital and/or ER visit that includes your diagnosis, lab results, and care plan X X  X     How to register for Edufii:  Once your e-mail address has been verified, follow the following steps to sign up for Edufii.     1. Go to  [email or web address]  2. Click on the Sign Up Now box, which takes you to the New Member Sign Up page. You will need to provide the following information:  a. Enter your Edufii Access Code exactly as it appears at the top of this page. (You will not need to use this code after you’ve completed the sign-up process. If you do not sign up before the expiration date, you must request a new code.)   b. Enter your date of birth.   c. Enter your home email address.   d. Click Submit, and follow the next screen’s instructions.  3. Create a Edufii ID. This will be your Edufii login ID and cannot be changed, so  think of one that is secure and easy to remember.  4. Create a [employer] password. You can change your password at any time.  5. Enter your Password Reset Question and Answer. This can be used at a later time if you forget your password.   6. Enter your e-mail address. This allows you to receive e-mail notifications when new information is available in [employer]eder.  7. Click Sign Up. You can now view your health information.    For assistance activating your [employer] account, call [phone]

## 2017-08-28 ENCOUNTER — TELEPHONE (OUTPATIENT)
Dept: PULMONOLOGY | Facility: HOSPICE | Age: 57
End: 2017-08-28

## 2017-08-28 RX ORDER — ALBUTEROL SULFATE 90 UG/1
2 AEROSOL, METERED RESPIRATORY (INHALATION) EVERY 4 HOURS PRN
Qty: 1 INHALER | Refills: 5 | Status: SHIPPED | OUTPATIENT
Start: 2017-08-28 | End: 2017-10-23 | Stop reason: SDUPTHER

## 2017-08-28 NOTE — TELEPHONE ENCOUNTER
Jude left a voicemail on my phone today and just wanted to let Nargis CORDON know that he did receive the Breo that was prescribed at his last visit but was unable to get the Pro-Air due to cost.

## 2017-08-29 NOTE — TELEPHONE ENCOUNTER
Notified pt that RX for a different rescue inhaler was sent to his pharmacy and to check to see if he can afford the new HFA.

## 2017-10-23 ENCOUNTER — OFFICE VISIT (OUTPATIENT)
Dept: PULMONOLOGY | Facility: HOSPICE | Age: 57
End: 2017-10-23
Payer: COMMERCIAL

## 2017-10-23 ENCOUNTER — HOME STUDY (OUTPATIENT)
Dept: PULMONOLOGY | Facility: HOSPICE | Age: 57
End: 2017-10-23
Attending: NURSE PRACTITIONER
Payer: COMMERCIAL

## 2017-10-23 VITALS
BODY MASS INDEX: 40.46 KG/M2 | DIASTOLIC BLOOD PRESSURE: 86 MMHG | WEIGHT: 273.2 LBS | OXYGEN SATURATION: 95 % | HEART RATE: 66 BPM | HEIGHT: 69 IN | RESPIRATION RATE: 16 BRPM | SYSTOLIC BLOOD PRESSURE: 132 MMHG

## 2017-10-23 DIAGNOSIS — J45.30 MILD PERSISTENT ASTHMA WITHOUT COMPLICATION: ICD-10-CM

## 2017-10-23 DIAGNOSIS — Z23 NEED FOR INFLUENZA VACCINATION: ICD-10-CM

## 2017-10-23 DIAGNOSIS — I48.19 PERSISTENT ATRIAL FIBRILLATION (HCC): ICD-10-CM

## 2017-10-23 DIAGNOSIS — J30.9 ALLERGIC RHINITIS, UNSPECIFIED CHRONICITY, UNSPECIFIED SEASONALITY, UNSPECIFIED TRIGGER: ICD-10-CM

## 2017-10-23 DIAGNOSIS — G47.33 OSA (OBSTRUCTIVE SLEEP APNEA): Chronic | ICD-10-CM

## 2017-10-23 DIAGNOSIS — Z79.01 CHRONIC ANTICOAGULATION: ICD-10-CM

## 2017-10-23 PROCEDURE — 90471 IMMUNIZATION ADMIN: CPT | Performed by: NURSE PRACTITIONER

## 2017-10-23 PROCEDURE — 99214 OFFICE O/P EST MOD 30 MIN: CPT | Mod: 25 | Performed by: NURSE PRACTITIONER

## 2017-10-23 PROCEDURE — 94762 N-INVAS EAR/PLS OXIMTRY CONT: CPT | Performed by: INTERNAL MEDICINE

## 2017-10-23 PROCEDURE — 90686 IIV4 VACC NO PRSV 0.5 ML IM: CPT | Performed by: NURSE PRACTITIONER

## 2017-10-23 RX ORDER — ALBUTEROL SULFATE 90 UG/1
2 AEROSOL, METERED RESPIRATORY (INHALATION) EVERY 4 HOURS PRN
Qty: 1 INHALER | Refills: 5 | Status: SHIPPED | OUTPATIENT
Start: 2017-10-23 | End: 2019-02-15 | Stop reason: SDUPTHER

## 2017-10-23 NOTE — PROGRESS NOTES
Chief Complaint   Patient presents with   • Apnea     10-16 CM H2O   • Asthma       HPI:  Jude Jiménez is a 57 y.o. year old male here today for follow-up on his Asthma and Obstructive Sleep Apnea. He was seen 6/27/2017 to re-establish care for his obstructive sleep apnea. He was referred back to our office by Cardiology for a reevaluation of his sleep apnea as he had recurrent atrial fibrillation. He was previously seen in our office in April 2008. Prior sleep study indicated severe obstructive sleep apnea with an RDI of 96.9. He had been on CPAP at 12 CM H20. His old machine did not have AHI capability. He was ordered a new machine with Auto CPAP 10-16 CM H20. His compliance card download today in the office indicates an AHI of 7.2 with an average use of 9 hours at night. His average peak pressure is 13.9. Overnight oximetry was ordered through his DME. However, has not been completed. He tolerates the pressure well. He has had some mask comfort issues. He notes occasional mask leaks. He does feel he sleeps better on therapy. He does feel he wakes more refreshed on therapy. He denies any morning headaches.      He states he has had 2 episodes of bronchitis this past year. He states his PCP has treated him with antibiotics. He has a 10 PYH of tobacco abuse. He quit smoking in 1998.     Chest xray was completed 7/28/2017 and indicates clear lungs.   PFT's 7/20/2017 indicated an FEV1 of 1.77 L, 52% predicted with an FEV1/FVC ratio of 59 with a positive bronchodilator response and a DLCO of 140% predicted.       He notes mild dyspnea with exercise. He feels he recovers well with rest. He notes an occasional cough in the morning which is productive with clear mucous. He notes sinus drainage which is often worse in the Spring months. He notes occasional wheezing with allergy triggers. He denies any fevers or chills. He was started on a trial of Breo 100/25 mcg 1 puff INH daily along with a Proair HFA inhaler at  "his last office visit. He states he started on the Breo, but never received the Proair. He feels his dyspnea has improved some. He states his wheezing has improved as well.      Echo 4/11/2017 indicated;     Technically difficult but adequate study for interpretation.   3 ml Optison contrast was used to enhance definition of the endocardial   borders.   Normal left ventricular size and systolic function. Mild concentric   left ventricular hypertrophy. Left ventricular ejection fraction is   visually estimated to be 60%. Diastolic function is difficult to assess   with atrial fibrillation.   Moderately dilated left atrium.  Compared to the report of the study done 7/2014 - there has been an   improvement in LVEF and estimated RVSP.       Past Medical History:   Diagnosis Date   • AF (atrial fibrillation) (CMS-HCC) 6/24/2010   • Alcohol abuse    • Arthritis     \"rhematoid eyes\"   • Atrial fibrillation (CMS-HCC) July 11, 2014    AF with RVR   • Bronchitis 6/25/14    ?   • Chickenpox    • Chronic anticoagulation    • Cold 6/25/14   • Eye pain    • Hearing difficulty    • Hypertension    • Infectious disease    • Obesity    • JACE (obstructive sleep apnea) 6/24/2010    uses CPAP   • Other emphysema (CMS-HCC)    • PAF (paroxysmal atrial fibrillation) (CMS-HCC) 10/1/2014   • Pneumonia 6/25/14    ?   • Sleep apnea    • Snoring    • Tonsillitis    • Wheezing        Past Surgical History:   Procedure Laterality Date   • RECOVERY  8/7/2014    Performed by Cath-Recovery Surgery at SURGERY SAME DAY [facility] ORS       Family History   Problem Relation Age of Onset   • Hypertension Mother        Social History     Social History   • Marital status:      Spouse name: N/A   • Number of children: N/A   • Years of education: N/A     Occupational History   • Not on file.     Social History Main Topics   • Smoking status: Former Smoker     Packs/day: 0.50     Years: 20.00     Types: Cigarettes     Quit date: 1/1/1998   • " Smokeless tobacco: Never Used   • Alcohol use 5.4 oz/week     6 Glasses of wine, 3 Shots of liquor per week      Comment: around 6 glasses of wine every day and 2-3 shots of tequilla   • Drug use:      Types: Marijuana      Comment: Marijuana every 2 weeks   • Sexual activity: Not on file     Other Topics Concern   • Not on file     Social History Narrative   • No narrative on file         ROS:  Constitutional: Denies fevers, chills, sweats, fatigue, weight loss  Eyes: Denies glasses, vision loss, pain, drainage, double vision  Ears/Nose/Mouth/Throat: Denies ear ache, difficulty hearing, sore throat, persistent hoarseness, decayed teeth/toothache  Cardiovascular: Denies chest pain, tightness, palpitations, swelling in feet/legs, fainting, difficulty breathing when laying down  Respiratory: See HPI   GI: Denies heartburn, difficulty swallowing, nausea, vomiting, abdominal pain, diarrhea, constipation  : Denies frequent urination, painful urination  Integumentary: Denies rashes, lumps or color changes  MSK: Denies painful joints, sore muscles, and back pain.   Neurological: Denies frequent headaches, dizziness, weakness  Sleep: See HPI       Current Outpatient Prescriptions   Medication Sig Dispense Refill   • Fluticasone Furoate-Vilanterol (BREO ELLIPTA) 100-25 MCG/INH AEROSOL POWDER, BREATH ACTIVATED Take 1 Inhalation by mouth every day. Rinse mouth after use. 1 Each 11   • rivaroxaban (XARELTO) 20 MG Tab tablet Take 1 Tab by mouth with dinner. 30 Tab 11   • metoprolol SR (TOPROL XL) 100 MG TABLET SR 24 HR Take 1 Tab by mouth every day. 30 Tab 11   • albuterol (VENTOLIN HFA) 108 (90 Base) MCG/ACT Aero Soln inhalation aerosol Inhale 2 Puffs by mouth every four hours as needed for Shortness of Breath (Wheezing). 1 Inhaler 5   • albuterol (PROAIR HFA) 108 (90 Base) MCG/ACT Aero Soln inhalation aerosol Inhale 2 Puffs by mouth every four hours as needed for Shortness of Breath (wheezing). 1 Inhaler 11     No current  "facility-administered medications for this visit.        No Known Allergies    Blood pressure 132/86, pulse 66, resp. rate 16, height 1.753 m (5' 9.02\"), weight 123.9 kg (273 lb 3.2 oz), SpO2 95 %.    PE:   Appearance: Well developed, well nourished, no acute distress  Eyes: PERRL, EOM intact, sclera white, conjunctiva moist  Ears: no lesions or deformities  Hearing: grossly intact  Nose: no lesions or deformities  Oropharynx: tongue normal, posterior pharynx without erythema or exudate  Mallampati Classification: class 3   Neck: supple, trachea midline, no masses   Respiratory effort: no intercostal retractions or use of accessory muscles  Lung auscultation: no rales, rhonchi or wheezes  Heart auscultation: no murmur rub or gallop  Extremities: no cyanosis or edema  Abdomen: soft ,non tender, no masses  Gait and Station: normal  Digits and nails: no clubbing, cyanosis, petechiae or nodes.  Cranial nerves: grossly intact  Skin: no rashes, lesions or ulcers noted  Orientation: Oriented to time, person and place  Mood and affect: mood and affect appropriate, normal interaction with examiner  Judgement: Intact          Assessment:  1. Mild persistent asthma without complication     2. Allergic rhinitis, unspecified chronicity, unspecified seasonality, unspecified trigger     3. JACE (obstructive sleep apnea)     4. BMI 40.0-44.9, adult (CMS-HCC)     5. Persistent atrial fibrillation (CMS-HCC)     6. Chronic anticoagulation           Plan:    1) Continue Breo 100/25 mcg 1 puff INH daily, rinse mouth after use. Order sent again to his local pharmacy for an Albuterol HFA inhaler 2 puffs q 4 hours prn.   2) Continue Auto CPAP 10-16 CM H20. He is using his machine nightly and benefiting from use. Re ordered overnight oximetry through our office to ensure adequate 02 saturations. He can call for results.   3) Weight loss recommended.  4) Discussed importance of routine exercise. Encourage use of Albuterol HFA prior to " exercise.   5) He is up to date on Pneumovax 23 vaccination. Updated Influenza vaccine today in the office.   6) Continue to follow with Cardiology.  7) Follow up in 3 months, sooner if needed.

## 2017-10-23 NOTE — PROGRESS NOTES
Chief Complaint   Patient presents with   • Apnea     10-16 CM H2O   • Asthma       HPI:  Jude Jiménez is a 57 y.o. year old male here today for follow-up on his Asthma and Obstructive Sleep Apnea. He was seen 6/27/2017 to re-establish care for his obstructive sleep apnea. He was referred back to our office by Cardiology for a reevaluation of his sleep apnea as he had recurrent atrial fibrillation. He was previously seen in our office in April 2008. Prior sleep study indicated severe obstructive sleep apnea with an RDI of 96.9. He had been on CPAP at 12 CM H20. His old machine did not have AHI capability. He was ordered a new machine with Auto CPAP 10-16 CM H20. His compliance card download today in the office indicates an AHI of 7.2 with an average use of 9 hours at night. His average peak pressure is 13.9. Overnight oximetry was ordered through his DME. However, has not been completed. He tolerates the pressure well. He has had some mask comfort issues. He notes occasional mask leaks. He does feel he sleeps better on therapy. He does feel he wakes more refreshed on therapy. He denies any morning headaches.      He states he has had 2 episodes of bronchitis this past year. He states his PCP has treated him with antibiotics. He has a 10 PYH of tobacco abuse. He quit smoking in 1998.     Chest xray was completed 7/28/2017 and indicates clear lungs.   PFT's 7/20/2017 indicated an FEV1 of 1.77 L, 52% predicted with an FEV1/FVC ratio of 59 with a positive bronchodilator response and a DLCO of 140% predicted.       He notes mild dyspnea with exercise. He feels he recovers well with rest. He notes an occasional cough in the morning which is productive with clear mucous. He notes sinus drainage which is often worse in the Spring months. He notes occasional wheezing with allergy triggers. He denies any fevers or chills. He was started on a trial of Breo 100/25 mcg 1 puff INH daily along with a Proair HFA inhaler at  "his last office visit.      Echo 4/11/2017 indicated;     Technically difficult but adequate study for interpretation.   3 ml Optison contrast was used to enhance definition of the endocardial   borders.   Normal left ventricular size and systolic function. Mild concentric   left ventricular hypertrophy. Left ventricular ejection fraction is   visually estimated to be 60%. Diastolic function is difficult to assess   with atrial fibrillation.   Moderately dilated left atrium.  Compared to the report of the study done 7/2014 - there has been an   improvement in LVEF and estimated RVSP.       Past Medical History:   Diagnosis Date   • AF (atrial fibrillation) (CMS-HCC) 6/24/2010   • Alcohol abuse    • Arthritis     \"rhematoid eyes\"   • Atrial fibrillation (CMS-HCC) July 11, 2014    AF with RVR   • Bronchitis 6/25/14    ?   • Chickenpox    • Chronic anticoagulation    • Cold 6/25/14   • Eye pain    • Hearing difficulty    • Hypertension    • Infectious disease    • Obesity    • JACE (obstructive sleep apnea) 6/24/2010    uses CPAP   • Other emphysema (CMS-HCC)    • PAF (paroxysmal atrial fibrillation) (CMS-HCC) 10/1/2014   • Pneumonia 6/25/14    ?   • Sleep apnea    • Snoring    • Tonsillitis    • Wheezing        Past Surgical History:   Procedure Laterality Date   • RECOVERY  8/7/2014    Performed by Cath-Recovery Surgery at SURGERY SAME DAY [facility] ORS       Family History   Problem Relation Age of Onset   • Hypertension Mother        Social History     Social History   • Marital status:      Spouse name: N/A   • Number of children: N/A   • Years of education: N/A     Occupational History   • Not on file.     Social History Main Topics   • Smoking status: Former Smoker     Packs/day: 0.50     Years: 20.00     Types: Cigarettes     Quit date: 1/1/1998   • Smokeless tobacco: Never Used   • Alcohol use 5.4 oz/week     6 Glasses of wine, 3 Shots of liquor per week      Comment: around 6 glasses of wine every day " "and 2-3 shots of tequilla   • Drug use:      Types: Marijuana      Comment: Marijuana every 2 weeks   • Sexual activity: Not on file     Other Topics Concern   • Not on file     Social History Narrative   • No narrative on file         ROS:  Constitutional: Denies fevers, chills, sweats, fatigue, weight loss  Eyes: Denies glasses, vision loss, pain, drainage, double vision  Ears/Nose/Mouth/Throat: Denies ear ache, difficulty hearing, sore throat, persistent hoarseness, decayed teeth/toothache  Cardiovascular: Denies chest pain, tightness, palpitations, swelling in feet/legs, fainting, difficulty breathing when laying down  Respiratory: See HPI   GI: Denies heartburn, difficulty swallowing, nausea, vomiting, abdominal pain, diarrhea, constipation  : Denies frequent urination, painful urination  Integumentary: Denies rashes, lumps or color changes  MSK: Denies painful joints, sore muscles, and back pain.   Neurological: Denies frequent headaches, dizziness, weakness  Sleep: See HPI       Current Outpatient Prescriptions   Medication Sig Dispense Refill   • Fluticasone Furoate-Vilanterol (BREO ELLIPTA) 100-25 MCG/INH AEROSOL POWDER, BREATH ACTIVATED Take 1 Inhalation by mouth every day. Rinse mouth after use. 1 Each 11   • rivaroxaban (XARELTO) 20 MG Tab tablet Take 1 Tab by mouth with dinner. 30 Tab 11   • metoprolol SR (TOPROL XL) 100 MG TABLET SR 24 HR Take 1 Tab by mouth every day. 30 Tab 11   • albuterol (VENTOLIN HFA) 108 (90 Base) MCG/ACT Aero Soln inhalation aerosol Inhale 2 Puffs by mouth every four hours as needed for Shortness of Breath (Wheezing). 1 Inhaler 5   • albuterol (PROAIR HFA) 108 (90 Base) MCG/ACT Aero Soln inhalation aerosol Inhale 2 Puffs by mouth every four hours as needed for Shortness of Breath (wheezing). 1 Inhaler 11     No current facility-administered medications for this visit.        No Known Allergies    Blood pressure 132/86, pulse 66, resp. rate 16, height 1.753 m (5' 9.02\"), " weight 123.9 kg (273 lb 3.2 oz), SpO2 95 %.    PE:   Appearance: Well developed, well nourished, no acute distress  Eyes: PERRL, EOM intact, sclera white, conjunctiva moist  Ears: no lesions or deformities  Hearing: grossly intact  Nose: no lesions or deformities  Oropharynx: tongue normal, posterior pharynx without erythema or exudate  Mallampati Classification: ***  Neck: supple, trachea midline, no masses   Respiratory effort: no intercostal retractions or use of accessory muscles  Lung auscultation: no rales, rhonchi or wheezes  Heart auscultation: no murmur rub or gallop  Extremities: no cyanosis or edema  Abdomen: soft ,non tender, no masses  Gait and Station: normal  Digits and nails: no clubbing, cyanosis, petechiae or nodes.  Cranial nerves: grossly intact  Skin: no rashes, lesions or ulcers noted  Orientation: Oriented to time, person and place  Mood and affect: mood and affect appropriate, normal interaction with examiner  Judgement: Intact          Assessment:  1. Mild persistent asthma without complication     2. Allergic rhinitis, unspecified chronicity, unspecified seasonality, unspecified trigger     3. JACE (obstructive sleep apnea)     4. BMI 40.0-44.9, adult (CMS-HCC)     5. Persistent atrial fibrillation (CMS-HCC)     6. Chronic anticoagulation           Plan:  ***

## 2017-10-23 NOTE — PATIENT INSTRUCTIONS
Plan:    1) Continue Breo 100/25 mcg 1 puff INH daily, rinse mouth after use. Order sent again to his local pharmacy for an Albuterol HFA inhaler 2 puffs q 4 hours prn.   2) Continue Auto CPAP 10-16 CM H20. He is using his machine nightly and benefiting from use. Re ordered overnight oximetry through our office to ensure adequate 02 saturations. He can call for results.   3) Weight loss recommended.  4) Discussed importance of routine exercise. Encourage use of Albuterol HFA prior to exercise.   5) He is up to date on Pneumovax 23 vaccination. Updated Influenza vaccine today in the office.   6) Continue to follow with Cardiology.  7) Follow up in 3 months, sooner if needed.

## 2017-10-23 NOTE — PROGRESS NOTES
Chief Complaint   Patient presents with   • Apnea     10-16 CM H2O   • Asthma       HPI:  Jude Jiménez is a 57 y.o. year old male here today for follow-up on his Asthma and Obstructive Sleep Apnea. He was seen 6/27/2017 to re-establish care for his obstructive sleep apnea. He was referred back to our office by Cardiology for a reevaluation of his sleep apnea as he had recurrent atrial fibrillation. He was previously seen in our office in April 2008. Prior sleep study indicated severe obstructive sleep apnea with an RDI of 96.9. He had been on CPAP at 12 CM H20. His old machine did not have AHI capability. He was ordered a new machine with Auto CPAP 10-16 CM H20. His compliance card download today in the office indicates an AHI of 7.2 with an average use of 9 hours at night. His average peak pressure is 13.9. Overnight oximetry was ordered through his DME. However, has not been completed. He tolerates the pressure well. He has had some mask comfort issues. He notes occasional mask leaks. He does feel he sleeps better on therapy. He does feel he wakes more refreshed on therapy. He denies any morning headaches.      He states he has had 2 episodes of bronchitis this past year. He states his PCP has treated him with antibiotics. He has a 10 PYH of tobacco abuse. He quit smoking in 1998.     Chest xray was completed 7/28/2017 and indicates clear lungs.   PFT's 7/20/2017 indicated an FEV1 of 1.77 L, 52% predicted with an FEV1/FVC ratio of 59 with a positive bronchodilator response and a DLCO of 140% predicted.       He notes mild dyspnea with exercise. He feels he recovers well with rest. He notes an occasional cough in the morning which is productive with clear mucous. He notes sinus drainage which is often worse in the Spring months. He notes occasional wheezing with allergy triggers. He denies any fevers or chills. He was started on a trial of Breo 100/25 mcg 1 puff INH daily along with a Proair HFA inhaler at  "his last office visit. He states he started on the Breo, but never received the Proair. He feels his dyspnea has improved some. He states his wheezing has improved as well.      Echo 4/11/2017 indicated;     Technically difficult but adequate study for interpretation.   3 ml Optison contrast was used to enhance definition of the endocardial   borders.   Normal left ventricular size and systolic function. Mild concentric   left ventricular hypertrophy. Left ventricular ejection fraction is   visually estimated to be 60%. Diastolic function is difficult to assess   with atrial fibrillation.   Moderately dilated left atrium.  Compared to the report of the study done 7/2014 - there has been an   improvement in LVEF and estimated RVSP.       Past Medical History:   Diagnosis Date   • AF (atrial fibrillation) (CMS-HCC) 6/24/2010   • Alcohol abuse    • Arthritis     \"rhematoid eyes\"   • Atrial fibrillation (CMS-HCC) July 11, 2014    AF with RVR   • Bronchitis 6/25/14    ?   • Chickenpox    • Chronic anticoagulation    • Cold 6/25/14   • Eye pain    • Hearing difficulty    • Hypertension    • Infectious disease    • Obesity    • JACE (obstructive sleep apnea) 6/24/2010    uses CPAP   • Other emphysema (CMS-HCC)    • PAF (paroxysmal atrial fibrillation) (CMS-HCC) 10/1/2014   • Pneumonia 6/25/14    ?   • Sleep apnea    • Snoring    • Tonsillitis    • Wheezing        Past Surgical History:   Procedure Laterality Date   • RECOVERY  8/7/2014    Performed by Cath-Recovery Surgery at SURGERY SAME DAY [facility] ORS       Family History   Problem Relation Age of Onset   • Hypertension Mother        Social History     Social History   • Marital status:      Spouse name: N/A   • Number of children: N/A   • Years of education: N/A     Occupational History   • Not on file.     Social History Main Topics   • Smoking status: Former Smoker     Packs/day: 0.50     Years: 20.00     Types: Cigarettes     Quit date: 1/1/1998   • " Smokeless tobacco: Never Used   • Alcohol use 5.4 oz/week     6 Glasses of wine, 3 Shots of liquor per week      Comment: around 6 glasses of wine every day and 2-3 shots of tequilla   • Drug use:      Types: Marijuana      Comment: Marijuana every 2 weeks   • Sexual activity: Not on file     Other Topics Concern   • Not on file     Social History Narrative   • No narrative on file         ROS:  Constitutional: Denies fevers, chills, sweats, fatigue, weight loss  Eyes: Denies glasses, vision loss, pain, drainage, double vision  Ears/Nose/Mouth/Throat: Denies ear ache, difficulty hearing, sore throat, persistent hoarseness, decayed teeth/toothache  Cardiovascular: Denies chest pain, tightness, palpitations, swelling in feet/legs, fainting, difficulty breathing when laying down  Respiratory: See HPI   GI: Denies heartburn, difficulty swallowing, nausea, vomiting, abdominal pain, diarrhea, constipation  : Denies frequent urination, painful urination  Integumentary: Denies rashes, lumps or color changes  MSK: Denies painful joints, sore muscles, and back pain.   Neurological: Denies frequent headaches, dizziness, weakness  Sleep: See HPI       Current Outpatient Prescriptions   Medication Sig Dispense Refill   • Fluticasone Furoate-Vilanterol (BREO ELLIPTA) 100-25 MCG/INH AEROSOL POWDER, BREATH ACTIVATED Take 1 Inhalation by mouth every day. Rinse mouth after use. 1 Each 11   • rivaroxaban (XARELTO) 20 MG Tab tablet Take 1 Tab by mouth with dinner. 30 Tab 11   • metoprolol SR (TOPROL XL) 100 MG TABLET SR 24 HR Take 1 Tab by mouth every day. 30 Tab 11   • albuterol (VENTOLIN HFA) 108 (90 Base) MCG/ACT Aero Soln inhalation aerosol Inhale 2 Puffs by mouth every four hours as needed for Shortness of Breath (Wheezing). 1 Inhaler 5   • albuterol (PROAIR HFA) 108 (90 Base) MCG/ACT Aero Soln inhalation aerosol Inhale 2 Puffs by mouth every four hours as needed for Shortness of Breath (wheezing). 1 Inhaler 11     No current  "facility-administered medications for this visit.        No Known Allergies    Blood pressure 132/86, pulse 66, resp. rate 16, height 1.753 m (5' 9.02\"), weight 123.9 kg (273 lb 3.2 oz), SpO2 95 %.    PE:   Appearance: Well developed, well nourished, no acute distress  Eyes: PERRL, EOM intact, sclera white, conjunctiva moist  Ears: no lesions or deformities  Hearing: grossly intact  Nose: no lesions or deformities  Oropharynx: tongue normal, posterior pharynx without erythema or exudate  Mallampati Classification: class 3   Neck: supple, trachea midline, no masses   Respiratory effort: no intercostal retractions or use of accessory muscles  Lung auscultation: no rales, rhonchi or wheezes  Heart auscultation: no murmur rub or gallop  Extremities: no cyanosis or edema  Abdomen: soft ,non tender, no masses  Gait and Station: normal  Digits and nails: no clubbing, cyanosis, petechiae or nodes.  Cranial nerves: grossly intact  Skin: no rashes, lesions or ulcers noted  Orientation: Oriented to time, person and place  Mood and affect: mood and affect appropriate, normal interaction with examiner  Judgement: Intact          Assessment:  1. Mild persistent asthma without complication     2. Allergic rhinitis, unspecified chronicity, unspecified seasonality, unspecified trigger     3. JACE (obstructive sleep apnea)     4. BMI 40.0-44.9, adult (CMS-HCC)     5. Persistent atrial fibrillation (CMS-HCC)     6. Chronic anticoagulation           Plan:    1) Continue Breo 100/25 mcg 1 puff INH daily, rinse mouth after use. Order sent again to his local pharmacy for an Albuterol HFA inhaler 2 puffs q 4 hours prn.   2) Continue Auto CPAP 10-16 CM H20. He is using his machine nightly and benefiting from use. Re ordered overnight oximetry through our office to ensure adequate 02 saturations. He can call for results.   3) Weight loss recommended.  4) Discussed importance of routine exercise. Encourage use of Albuterol HFA prior to " exercise.   5) He is up to date on Pneumovax 23 vaccination. Updated Influenza vaccine today in the office.   6) Continue to follow with Cardiology.  7) Follow up in 3 months, sooner if needed.

## 2017-10-24 ENCOUNTER — TELEPHONE (OUTPATIENT)
Dept: PULMONOLOGY | Facility: HOSPICE | Age: 57
End: 2017-10-24

## 2017-10-24 NOTE — PROCEDURES
Overnight continuous pulse oximetry was performed while the patient used auto CPAP 10-16 centers of water pressure. Initial part of the study there was no recording. Once recording started was no evidence of any significant oxygen desaturation.

## 2017-10-25 ENCOUNTER — TELEPHONE (OUTPATIENT)
Dept: PULMONOLOGY | Facility: HOSPICE | Age: 57
End: 2017-10-25

## 2017-11-02 ENCOUNTER — TELEPHONE (OUTPATIENT)
Dept: PULMONOLOGY | Facility: HOSPICE | Age: 57
End: 2017-11-02

## 2017-11-02 NOTE — TELEPHONE ENCOUNTER
MEDICATION PRIOR AUTHORIZATION NEEDED:    1. Name of Medication: Breo Ellipta 100-25 mcg    2. Requested By (Name of Pharmacy): [facility]     3. Is insurance on file current? yes    4. What is the name & phone number of the 3rd party payor? [employer] [phone]

## 2017-11-16 NOTE — TELEPHONE ENCOUNTER
Left the pt a  mssg stating that Breo was denied by his insurance and that we called in another inhaler to his pharmacy for him to try.  I asked him to call with any problems or concerns.

## 2017-11-16 NOTE — TELEPHONE ENCOUNTER
DOCUMENTATION OF PRIOR AUTH STATUS    1. Medication name and dose: Breo Ellipta 100/25 mcg    2. Name and Phone # of Prescription coverage company: [employer] [phone]    3. Date Prior Auth was submitted: 11/1/2017    4. What information was given to obtain insurance decision: Clinical notes    5. Prior Auth letter Approved or Denied: Denied    6. Pharmacy notified: No    7. Patient notified: No      Breo Ellipta 100/25 mcg was denied.  The pt must try Dulera first.  Dx is Asthma.  Please advise, thank you.

## 2018-01-24 ENCOUNTER — OFFICE VISIT (OUTPATIENT)
Dept: PULMONOLOGY | Facility: HOSPICE | Age: 58
End: 2018-01-24
Payer: COMMERCIAL

## 2018-01-24 VITALS
SYSTOLIC BLOOD PRESSURE: 140 MMHG | RESPIRATION RATE: 16 BRPM | BODY MASS INDEX: 40.14 KG/M2 | OXYGEN SATURATION: 97 % | HEIGHT: 69 IN | HEART RATE: 96 BPM | TEMPERATURE: 97.7 F | DIASTOLIC BLOOD PRESSURE: 90 MMHG | WEIGHT: 271 LBS

## 2018-01-24 DIAGNOSIS — I48.19 PERSISTENT ATRIAL FIBRILLATION (HCC): ICD-10-CM

## 2018-01-24 DIAGNOSIS — G47.33 OSA (OBSTRUCTIVE SLEEP APNEA): Chronic | ICD-10-CM

## 2018-01-24 DIAGNOSIS — Z79.01 CHRONIC ANTICOAGULATION: ICD-10-CM

## 2018-01-24 DIAGNOSIS — J30.9 ALLERGIC RHINITIS, UNSPECIFIED CHRONICITY, UNSPECIFIED SEASONALITY, UNSPECIFIED TRIGGER: ICD-10-CM

## 2018-01-24 DIAGNOSIS — J45.30 MILD PERSISTENT ASTHMA WITHOUT COMPLICATION: ICD-10-CM

## 2018-01-24 PROCEDURE — 99214 OFFICE O/P EST MOD 30 MIN: CPT | Performed by: NURSE PRACTITIONER

## 2018-01-24 RX ORDER — AZITHROMYCIN 250 MG/1
TABLET, FILM COATED ORAL
Qty: 6 TAB | Refills: 1 | Status: SHIPPED | OUTPATIENT
Start: 2018-01-24 | End: 2018-05-24

## 2018-01-24 NOTE — PATIENT INSTRUCTIONS
Plan:    1) Continue Breo 100/25 mcg 1 puff INH daily, rinse mouth after use. Continue Ventolin HFA inhaler 2 puffs q 4 hours prn. RX for Zpak to have on hand.   2) Continue Auto CPAP 10-16 CM H20. Encouraged to bring chip to follow up visit.   3) Weight loss recommended.  4) Discussed importance of routine exercise. Encourage use of Albuterol HFA prior to exercise.   5) He is up to date on Pneumovax 23 and Influenza vaccination.   6) Continue to follow with Cardiology.  7) Follow up in 6 months with updated PFT's, sooner if needed.

## 2018-01-24 NOTE — PROGRESS NOTES
Chief Complaint   Patient presents with   • Apnea       HPI:  Jude Jiménez is a 57 y.o. year old male here today for follow-up on his Asthma and Obstructive Sleep Apnea. He was referred back to our office by Cardiology for a reevaluation of his sleep apnea as he had recurrent atrial fibrillation. Prior sleep study indicated severe obstructive sleep apnea with an RDI of 96.9. He had been on CPAP at 12 CM H20. His old machine did not have AHI capability. He was ordered a new machine with Auto CPAP 10-16 CM H20. His compliance card download at his last office visit indicated an AHI of 7.2 with an average use of 9 hours at night. His average peak pressure is 13.9. He did not bring his chip to today's office visit. Overnight oximetry 10/23/2017 indicates a mean 02 of 90.1%. He had mild desaturations the first part of the study which appears to be related more to artifact. He tolerates the pressure well. He has had some mask comfort issues. He notes occasional mask leaks. He does feel he sleeps better on therapy. He does feel he wakes more refreshed on therapy. He denies any morning headaches.      He states he has had 2 episodes of bronchitis this past year. He states his PCP has treated him with antibiotics. He has a 10 PYH of tobacco abuse. He quit smoking in 1998.      Chest xray was completed 7/28/2017 and indicates clear lungs.   PFT's 7/20/2017 indicated an FEV1 of 1.77 L, 52% predicted with an FEV1/FVC ratio of 59 with a positive bronchodilator response and a DLCO of 140% predicted.         He notes mild dyspnea with exercise. He feels he recovers well with rest. He notes an occasional cough in the morning which is productive with clear mucous. He notes sinus drainage which is often worse in the Spring months. He notes occasional wheezing with allergy triggers. He denies any fevers or chills. He was started on Breo 100/25 mcg 1 puff INH daily along with a Ventolin HFA inhaler. He feels his dyspnea has  "improved some. He states his wheezing has resolved with addition of the Breo.      Echo 4/11/2017 indicated;     Technically difficult but adequate study for interpretation.   3 ml Optison contrast was used to enhance definition of the endocardial   borders.   Normal left ventricular size and systolic function. Mild concentric   left ventricular hypertrophy. Left ventricular ejection fraction is   visually estimated to be 60%. Diastolic function is difficult to assess   with atrial fibrillation.   Moderately dilated left atrium.  Compared to the report of the study done 7/2014 - there has been an   improvement in LVEF and estimated RVSP.       Past Medical History:   Diagnosis Date   • AF (atrial fibrillation) (CMS-HCC) 6/24/2010   • Alcohol abuse    • Arthritis     \"rhematoid eyes\"   • Atrial fibrillation (CMS-HCC) July 11, 2014    AF with RVR   • Bronchitis 6/25/14    ?   • Chickenpox    • Chronic anticoagulation    • Cold 6/25/14   • Eye pain    • Hearing difficulty    • Hypertension    • Infectious disease    • Obesity    • JAEC (obstructive sleep apnea) 6/24/2010    uses CPAP   • Other emphysema (CMS-HCC)    • PAF (paroxysmal atrial fibrillation) (CMS-HCC) 10/1/2014   • Pneumonia 6/25/14    ?   • Sleep apnea    • Snoring    • Tonsillitis    • Wheezing        Past Surgical History:   Procedure Laterality Date   • RECOVERY  8/7/2014    Performed by Cath-Recovery Surgery at SURGERY SAME DAY [facility] ORS       Family History   Problem Relation Age of Onset   • Hypertension Mother        Social History     Social History   • Marital status:      Spouse name: N/A   • Number of children: N/A   • Years of education: N/A     Occupational History   • Not on file.     Social History Main Topics   • Smoking status: Former Smoker     Packs/day: 0.50     Years: 20.00     Types: Cigarettes     Quit date: 1/1/1998   • Smokeless tobacco: Never Used   • Alcohol use 5.4 oz/week     6 Glasses of wine, 3 Shots of liquor per " "week      Comment: around 6 glasses of wine every day and 2-3 shots of tequilla   • Drug use: Yes     Types: Marijuana      Comment: Marijuana every 2 weeks   • Sexual activity: Not on file     Other Topics Concern   • Not on file     Social History Narrative   • No narrative on file         ROS:  Constitutional: Denies fevers, chills, sweats, fatigue, weight loss  Eyes: Denies glasses, vision loss, pain, drainage, double vision  Ears/Nose/Mouth/Throat: Denies ear ache, sore throat, persistent hoarseness, decayed teeth/toothache  Cardiovascular: Denies chest pain, tightness, palpitations, swelling in feet/legs, fainting, difficulty breathing when laying down  Respiratory: See HPI   GI: Denies heartburn, difficulty swallowing, nausea, vomiting, abdominal pain, diarrhea, constipation  : Denies frequent urination, painful urination  Integumentary: Denies rashes, lumps or color changes  MSK: Denies painful joints, sore muscles, and back pain.   Neurological: Denies frequent headaches, dizziness, weakness  Sleep: See HPI     Current Outpatient Prescriptions   Medication Sig Dispense Refill   • Mometasone Furo-Formoterol Fum (DULERA) 100-5 MCG/ACT Aerosol Inhale 2 Puffs by mouth 2 Times a Day. Use spacer. Rinse mouth after use. 1 Inhaler 5   • albuterol (VENTOLIN HFA) 108 (90 Base) MCG/ACT Aero Soln inhalation aerosol Inhale 2 Puffs by mouth every four hours as needed for Shortness of Breath (Wheezing). 1 Inhaler 5   • rivaroxaban (XARELTO) 20 MG Tab tablet Take 1 Tab by mouth with dinner. 30 Tab 11   • metoprolol SR (TOPROL XL) 100 MG TABLET SR 24 HR Take 1 Tab by mouth every day. 30 Tab 11     No current facility-administered medications for this visit.        No Known Allergies    Blood pressure 140/90, pulse 96, temperature 36.5 °C (97.7 °F), resp. rate 16, height 1.753 m (5' 9\"), weight 122.9 kg (271 lb), SpO2 97 %.    PE:   Appearance: Well developed, well nourished, no acute distress  Eyes: PERRL, EOM intact, " sclera white, conjunctiva moist  Ears: no lesions or deformities  Hearing: [place], hearing loss   Nose: no lesions or deformities  Oropharynx: tongue normal, posterior pharynx without erythema or exudate  Mallampati Classification: Class 3   Neck: supple, trachea midline, no masses   Respiratory effort: no intercostal retractions or use of accessory muscles  Lung auscultation: no rales, rhonchi or wheezes  Heart auscultation: no murmur rub or gallop  Extremities: no cyanosis or edema  Abdomen: soft ,non tender, no masses  Gait and Station: normal  Digits and nails: no clubbing, cyanosis, petechiae or nodes.  Cranial nerves: grossly intact  Skin: no rashes, lesions or ulcers noted  Orientation: Oriented to time, person and place  Mood and affect: mood and affect appropriate, normal interaction with examiner  Judgement: Intact          Assessment:  1. JACE (obstructive sleep apnea)     2. Mild persistent asthma without complication  azithromycin (ZITHROMAX Z-YUNI) 250 MG Tab    AMB PULMONARY FUNCTION TEST/LAB   3. Allergic rhinitis, unspecified chronicity, unspecified seasonality, unspecified trigger     4. BMI 40.0-44.9, adult (CMS-[facility])     5. Persistent atrial fibrillation (CMS-HCC)     6. Chronic anticoagulation           Plan:    1) Continue Breo 100/25 mcg 1 puff INH daily, rinse mouth after use. Continue Ventolin HFA inhaler 2 puffs q 4 hours prn. RX for Zpak to have on hand.   2) Continue Auto CPAP 10-16 CM H20. Encouraged to bring chip to follow up visit.   3) Weight loss recommended.  4) Discussed importance of routine exercise. Encourage use of Albuterol HFA prior to exercise.   5) He is up to date on Pneumovax 23 and Influenza vaccination.   6) Continue to follow with Cardiology.  7) Follow up in 6 months with updated PFT's, sooner if needed.

## 2018-03-16 ENCOUNTER — OFFICE VISIT (OUTPATIENT)
Dept: CARDIOLOGY | Facility: MEDICAL CENTER | Age: 58
End: 2018-03-16
Payer: COMMERCIAL

## 2018-03-16 VITALS
SYSTOLIC BLOOD PRESSURE: 142 MMHG | HEIGHT: 69 IN | OXYGEN SATURATION: 95 % | DIASTOLIC BLOOD PRESSURE: 86 MMHG | HEART RATE: 80 BPM | WEIGHT: 268 LBS | BODY MASS INDEX: 39.69 KG/M2

## 2018-03-16 DIAGNOSIS — I10 ESSENTIAL HYPERTENSION, BENIGN: ICD-10-CM

## 2018-03-16 DIAGNOSIS — I48.0 PAF (PAROXYSMAL ATRIAL FIBRILLATION) (HCC): ICD-10-CM

## 2018-03-16 DIAGNOSIS — Z79.01 CHRONIC ANTICOAGULATION: ICD-10-CM

## 2018-03-16 DIAGNOSIS — G47.33 OSA (OBSTRUCTIVE SLEEP APNEA): Chronic | ICD-10-CM

## 2018-03-16 PROCEDURE — 99214 OFFICE O/P EST MOD 30 MIN: CPT | Performed by: NURSE PRACTITIONER

## 2018-03-16 RX ORDER — METOPROLOL SUCCINATE 100 MG/1
100 TABLET, EXTENDED RELEASE ORAL DAILY
Qty: 30 TAB | Refills: 11 | Status: SHIPPED | OUTPATIENT
Start: 2018-03-16 | End: 2019-03-29 | Stop reason: SDUPTHER

## 2018-03-16 ASSESSMENT — ENCOUNTER SYMPTOMS
COUGH: 0
FALLS: 0
DIZZINESS: 0
BRUISES/BLEEDS EASILY: 0
BLURRED VISION: 0
DOUBLE VISION: 0
SHORTNESS OF BREATH: 0
WHEEZING: 0
ORTHOPNEA: 0
FOCAL WEAKNESS: 0
WEAKNESS: 0
HEADACHES: 0
PALPITATIONS: 0
LOSS OF CONSCIOUSNESS: 0
BLOOD IN STOOL: 0

## 2018-03-16 NOTE — PROGRESS NOTES
"Subjective:   Jude Jiménez is a 56 y.o. male who presents today for follow up P. Afib and JACE.    He is patient of Dr. Fleming in our clinic, HX: Paroxysmal atrial fibrillation, hypertension, emphysema, and obstructive sleep apnea.    Patient was last seen may 2017 for atrial fibrillation with Dr. Vides. He discussed treatment plan for atrial fibrillation with possible cardioversion. He did not want to proceed with cardioversion at the time.    Patient is doing well. Patient has been compliant with his medication. He was able to shovel snow this morning, asymptomatic.     He has sleep apnea and has been wearing CPAP. He seen his pulmonologist about two months ago and setting was checked.     He has had no episodes of chest pain, palpitations, dizziness/lightheadedness, shortness of breath, orthopnea, or peripheral edema.    Past Medical History:   Diagnosis Date   • AF (atrial fibrillation) (CMS-HCC) 6/24/2010   • Alcohol abuse    • Arthritis     \"rhematoid eyes\"   • Atrial fibrillation (CMS-HCC) July 11, 2014    AF with RVR   • Bronchitis 6/25/14    ?   • Chickenpox    • Chronic anticoagulation    • Cold 6/25/14   • Eye pain    • Hearing difficulty    • Hypertension    • Infectious disease    • Obesity    • JACE (obstructive sleep apnea) 6/24/2010    uses CPAP   • Other emphysema (CMS-HCC)    • PAF (paroxysmal atrial fibrillation) (CMS-HCC) 10/1/2014   • Pneumonia 6/25/14    ?   • Sleep apnea    • Snoring    • Tonsillitis    • Wheezing      Past Surgical History:   Procedure Laterality Date   • RECOVERY  8/7/2014    Performed by Cath-Recovery Surgery at SURGERY SAME DAY [facility] ORS     Family History   Problem Relation Age of Onset   • Hypertension Mother      History   Smoking Status   • Former Smoker   • Packs/day: 0.50   • Years: 20.00   • Types: Cigarettes   • Quit date: 1/1/1998   Smokeless Tobacco   • Never Used     No Known Allergies  Outpatient Encounter Prescriptions as of 3/16/2018   Medication " "Sig Dispense Refill   • rivaroxaban (XARELTO) 20 MG Tab tablet Take 1 Tab by mouth with dinner. 30 Tab 11   • metoprolol SR (TOPROL XL) 100 MG TABLET SR 24 HR Take 1 Tab by mouth every day. 30 Tab 11   • Mometasone Furo-Formoterol Fum (DULERA) 100-5 MCG/ACT Aerosol Inhale 2 Puffs by mouth 2 Times a Day. Use spacer. Rinse mouth after use. 1 Inhaler 5   • albuterol (VENTOLIN HFA) 108 (90 Base) MCG/ACT Aero Soln inhalation aerosol Inhale 2 Puffs by mouth every four hours as needed for Shortness of Breath (Wheezing). 1 Inhaler 5   • azithromycin (ZITHROMAX Z-YUNI) 250 MG Tab Take 2 tablets on day 1. Then take 1 tablet a day for 4 days. (Patient not taking: Reported on 3/16/2018) 6 Tab 1   • [DISCONTINUED] rivaroxaban (XARELTO) 20 MG Tab tablet Take 1 Tab by mouth with dinner. 30 Tab 11   • [DISCONTINUED] metoprolol SR (TOPROL XL) 100 MG TABLET SR 24 HR Take 1 Tab by mouth every day. 30 Tab 11     No facility-administered encounter medications on file as of 3/16/2018.      Review of Systems   Constitutional: Negative for malaise/fatigue.   Eyes: Negative for blurred vision and double vision.   Respiratory: Negative for cough, shortness of breath and wheezing.    Cardiovascular: Negative for chest pain, palpitations, orthopnea and leg swelling.   Gastrointestinal: Negative for blood in stool and melena.   Musculoskeletal: Negative for falls.   Neurological: Negative for dizziness, focal weakness, loss of consciousness, weakness and headaches.   Endo/Heme/Allergies: Does not bruise/bleed easily.   All other systems reviewed and are negative.       Objective:   /86   Pulse 80   Ht 1.753 m (5' 9\")   Wt 121.6 kg (268 lb)   SpO2 95%   BMI 39.58 kg/m²     Physical Exam   Constitutional: He is oriented to person, place, and time. He appears well-developed and well-nourished.   HENT:   Head: Normocephalic.   Neck: Normal range of motion. No JVD present.   Cardiovascular: Normal rate and normal heart sounds.  An " irregularly irregular rhythm present.   No murmur heard.  Pulmonary/Chest: Effort normal and breath sounds normal. No respiratory distress. He has no wheezes.   Abdominal: Bowel sounds are normal.   Musculoskeletal: He exhibits no edema or tenderness.   Neurological: He is alert and oriented to person, place, and time.   Skin: Skin is warm and dry.   Psychiatric: His behavior is normal. Judgment normal.   Nursing note and vitals reviewed.      Echocardiography Laboratory  4/10/2017  Technically difficult but adequate study for interpretation.   3 ml Optison contrast was used to enhance definition of the endocardial   borders.   Normal left ventricular size and systolic function. Mild concentric   left ventricular hypertrophy. Left ventricular ejection fraction is   visually estimated to be 60%. Diastolic function is difficult to assess   with atrial fibrillation.   Moderately dilated left atrium.  Compared to the report of the study done 7/2014 - there has been an   improvement in LVEF and estimated RVSP.     Lab Results   Component Value Date/Time    CHOLSTRLTOT 197 11/11/2016 07:20 AM    CHOLSTRLTOT 143 06/25/2010 09:37 AM     (H) 11/11/2016 07:20 AM     06/25/2010 09:37 AM    HDL 51 11/11/2016 07:20 AM    HDL 33 (L) 06/25/2010 09:37 AM    TRIGLYCERIDE 132 11/11/2016 07:20 AM    TRIGLYCERIDE 52 06/25/2010 09:37 AM       Lab Results   Component Value Date/Time    SODIUM 138 11/11/2016 07:20 AM    SODIUM 133 (L) 08/06/2014 08:58 AM    POTASSIUM 4.3 11/11/2016 07:20 AM    POTASSIUM 4.3 08/06/2014 08:58 AM    CHLORIDE 100 11/11/2016 07:20 AM    CHLORIDE 101 08/06/2014 08:58 AM    CO2 23 11/11/2016 07:20 AM    CO2 25 08/06/2014 08:58 AM    GLUCOSE 79 11/11/2016 07:20 AM    GLUCOSE 96 08/06/2014 08:58 AM    BUN 23 11/11/2016 07:20 AM    BUN 20 08/06/2014 08:58 AM    CREATININE 0.78 11/11/2016 07:20 AM    CREATININE 1.04 08/06/2014 08:58 AM    CREATININE 1.1 11/30/2007 11:32 AM    BUNCREATRAT 29 (H)  11/11/2016 07:20 AM     Lab Results   Component Value Date/Time    ALKPHOSPHAT 89 11/11/2016 07:20 AM    ALKPHOSPHAT 73 07/09/2014 05:47 PM    ASTSGOT 24 11/11/2016 07:20 AM    ASTSGOT 29 07/09/2014 05:47 PM    ALTSGPT 28 11/11/2016 07:20 AM    ALTSGPT 36 07/09/2014 05:47 PM    TBILIRUBIN 0.6 11/11/2016 07:20 AM    TBILIRUBIN 0.9 07/09/2014 05:47 PM            Assessment:     1. PAF (paroxysmal atrial fibrillation) (CMS-HCC)  rivaroxaban (XARELTO) 20 MG Tab tablet    metoprolol SR (TOPROL XL) 100 MG TABLET SR 24 HR    BASIC METABOLIC PANEL    LIPID PROFILE   2. JACE (obstructive sleep apnea)  BASIC METABOLIC PANEL    LIPID PROFILE   3. Chronic anticoagulation  rivaroxaban (XARELTO) 20 MG Tab tablet   4. Essential hypertension, benign         Medical Decision Making:  Today's Assessment / Status / Plan:     1. PAF:  - Denies any chest pain or palpitation. His dyspnea on exertion has resolved.   - rate controlled on metoprolol XL 100mg   - oral anticoagulation Xarelto 20 mg.     2. Obstructive sleep apnea:  - pulmonology managing   - tolerating his cpap well     3. Chronic anticoagulation:  - on oral xarelto 20mg   - denies any bleeding or melena       4. Hypertension:  - patient is going to monitor closely at home, report blood pressure log in a month.  - Decided in a month whether to change his medications.   - continue metoprolol 100mg XL     Follow-up in 6 months, sooner as needed. Lipid and BMP labs prior to next appt.     Collaborating Provider: Dr. Sherman

## 2018-03-18 LAB
BUN SERPL-MCNC: 18 MG/DL (ref 6–24)
BUN/CREAT SERPL: 26 (ref 9–20)
CALCIUM SERPL-MCNC: 8.6 MG/DL (ref 8.7–10.2)
CHLORIDE SERPL-SCNC: 103 MMOL/L (ref 96–106)
CHOLEST SERPL-MCNC: 224 MG/DL (ref 100–199)
CO2 SERPL-SCNC: 21 MMOL/L (ref 18–29)
CREAT SERPL-MCNC: 0.68 MG/DL (ref 0.76–1.27)
GFR SERPLBLD CREATININE-BSD FMLA CKD-EPI: 105 ML/MIN/1.73
GFR SERPLBLD CREATININE-BSD FMLA CKD-EPI: 122 ML/MIN/1.73
GLUCOSE SERPL-MCNC: 88 MG/DL (ref 65–99)
HDLC SERPL-MCNC: 72 MG/DL
LABORATORY COMMENT REPORT: ABNORMAL
LDLC SERPL CALC-MCNC: 126 MG/DL (ref 0–99)
POTASSIUM SERPL-SCNC: 4.4 MMOL/L (ref 3.5–5.2)
SODIUM SERPL-SCNC: 139 MMOL/L (ref 134–144)
TRIGL SERPL-MCNC: 129 MG/DL (ref 0–149)
VLDLC SERPL CALC-MCNC: 26 MG/DL (ref 5–40)

## 2018-05-24 ENCOUNTER — OFFICE VISIT (OUTPATIENT)
Dept: MEDICAL GROUP | Facility: MEDICAL CENTER | Age: 58
End: 2018-05-24
Payer: COMMERCIAL

## 2018-05-24 VITALS
SYSTOLIC BLOOD PRESSURE: 124 MMHG | TEMPERATURE: 98.6 F | HEIGHT: 69 IN | RESPIRATION RATE: 16 BRPM | HEART RATE: 73 BPM | OXYGEN SATURATION: 95 % | WEIGHT: 263 LBS | DIASTOLIC BLOOD PRESSURE: 80 MMHG | BODY MASS INDEX: 38.95 KG/M2

## 2018-05-24 DIAGNOSIS — Z23 VACCINE FOR TETANUS TOXOID: ICD-10-CM

## 2018-05-24 DIAGNOSIS — F10.10 ALCOHOL ABUSE: ICD-10-CM

## 2018-05-24 DIAGNOSIS — E78.5 DYSLIPIDEMIA: ICD-10-CM

## 2018-05-24 DIAGNOSIS — Z12.5 SCREENING PSA (PROSTATE SPECIFIC ANTIGEN): ICD-10-CM

## 2018-05-24 PROCEDURE — 90471 IMMUNIZATION ADMIN: CPT | Performed by: INTERNAL MEDICINE

## 2018-05-24 PROCEDURE — 90715 TDAP VACCINE 7 YRS/> IM: CPT | Performed by: INTERNAL MEDICINE

## 2018-05-24 PROCEDURE — 99214 OFFICE O/P EST MOD 30 MIN: CPT | Mod: 25 | Performed by: INTERNAL MEDICINE

## 2018-05-24 RX ORDER — ATORVASTATIN CALCIUM 20 MG/1
20 TABLET, FILM COATED ORAL DAILY
Qty: 90 TAB | Refills: 3 | Status: SHIPPED | OUTPATIENT
Start: 2018-05-24 | End: 2019-06-03 | Stop reason: SDUPTHER

## 2018-05-24 ASSESSMENT — PATIENT HEALTH QUESTIONNAIRE - PHQ9: CLINICAL INTERPRETATION OF PHQ2 SCORE: 0

## 2018-05-24 NOTE — PROGRESS NOTES
"CC: Follow-up dyslipidemia, alcohol abuse, obesity    HPI:   Jude presents today with the following.    1. Alcohol abuse  Presents reporting still having 3 shots per night and as well as 2 large glasses of wine.  He has cut down from previous.  Denies any blood in stool or dark tarry stool.    2. Dyslipidemia  Recent check of cholesterol shows elevated numbers does have a family history of heart disease in his father.  He denies any chest pain or shortness breath is followed by cardiology for his atrial fibrillation and is under good rate control.    3. BMI 38.0-38.9,adult  Trying to eat healthy does not put the connection together about the amount of calories and that alcohol he drinks.        Patient Active Problem List    Diagnosis Date Noted   • Alcohol abuse      Priority: High   • JACE (obstructive sleep apnea) 06/24/2010     Priority: High   • Chronic anticoagulation      Priority: Medium   • Persistent atrial fibrillation (HCC) 05/19/2017   • IGT (impaired glucose tolerance) 11/10/2016   • Iritis 03/17/2011       Current Outpatient Prescriptions   Medication Sig Dispense Refill   • atorvastatin (LIPITOR) 20 MG Tab Take 1 Tab by mouth every day. 90 Tab 3   • rivaroxaban (XARELTO) 20 MG Tab tablet Take 1 Tab by mouth with dinner. 30 Tab 11   • metoprolol SR (TOPROL XL) 100 MG TABLET SR 24 HR Take 1 Tab by mouth every day. 30 Tab 11   • Mometasone Furo-Formoterol Fum (DULERA) 100-5 MCG/ACT Aerosol Inhale 2 Puffs by mouth 2 Times a Day. Use spacer. Rinse mouth after use. 1 Inhaler 5   • albuterol (VENTOLIN HFA) 108 (90 Base) MCG/ACT Aero Soln inhalation aerosol Inhale 2 Puffs by mouth every four hours as needed for Shortness of Breath (Wheezing). 1 Inhaler 5     No current facility-administered medications for this visit.          Allergies as of 05/24/2018   • (No Known Allergies)        ROS: Denies Chest pain, SOB, LE edema.    /80   Pulse 73   Temp 37 °C (98.6 °F)   Resp 16   Ht 1.753 m (5' 9\")   " Wt 119.3 kg (263 lb)   SpO2 95%   BMI 38.84 kg/m²     Physical Exam:  Gen:         Alert and oriented, No apparent distress.  Neck:        No Lymphadenopathy or Bruits.  Lungs:     Clear to auscultation bilaterally  CV:          Regular rate and rhythm. No murmurs, rubs or gallops.               Ext:          No clubbing, cyanosis, edema.      Assessment and Plan.   57 y.o. male with the following issues.    1. Alcohol abuse  Long discussion today about cessation of alcohol at the very least cutting down and avoiding more than 1 drink per night.    2. Dyslipidemia  Discussion about risks have placed on Lipitor see back in 2 months with blood work.  - atorvastatin (LIPITOR) 20 MG Tab; Take 1 Tab by mouth every day.  Dispense: 90 Tab; Refill: 3  - COMP METABOLIC PANEL; Future  - LIPID PROFILE; Future    3. BMI 38.0-38.9,adult  Again discussed diet exercise and weight loss.    4. Screening PSA (prostate specific antigen)    - PROSTATE SPECIFIC AG SCREENING; Future    5. Vaccine for tetanus toxoid    - TDAP VACCINE =>8YO IM

## 2018-05-24 NOTE — LETTER
[facility]  Sahil Marquez M.D.  [address]  [place] [address]  Fax: [phone]   Authorization for Release/Disclosure of   Protected Health Information   Name: DC AGUERO : 1960 SSN: [id number]   Address: [address] [place] [address] [address]  [place] [address] Phone:    [phone] (home)    I authorize the entity listed below to release/disclose the PHI below to:   [employer]/Sahil Marquez M.D. and Sahil Marquez M.D.   Provider or Entity Name:  [facility]   Address   [facility], [address]            [address], [place] [address] Phone:  [phone]      Fax:       [phone]          Reason for request: continuity of care   Information to be released:    [ X ] LAST COLONOSCOPY,  including any PATH REPORT and follow-up  [ X ] LAST FIT/COLOGUARD RESULT [  ] LAST DEXA  [  ] LAST MAMMOGRAM  [  ] LAST PAP  [  ] LAST LABS [  ] RETINA EXAM REPORT  [  ] IMMUNIZATION RECORDS  [  ] Release all info      [  ] Check here and initial the line next to each item to release ALL health information INCLUDING  _____ Care and treatment for drug and / or alcohol abuse  _____ HIV testing, infection status, or AIDS  _____ Genetic Testing    DATES OF SERVICE OR TIME PERIOD TO BE DISCLOSED: _____________  I understand and acknowledge that:  * This Authorization may be revoked at any time by you in writing, except if your health information has already been used or disclosed.  * Your health information that will be used or disclosed as a result of you signing this authorization could be re-disclosed by the recipient. If this occurs, your re-disclosed health information may no longer be protected by State or Federal laws.  * You may refuse to sign this Authorization. Your refusal will not affect your ability to obtain treatment.  * This Authorization becomes effective upon signing and will  on (date) __________.      If no date is indicated, this Authorization will  one (1) year  from the signature date.    Name: Jude Mann [place]    Signature:   Date:     5/24/2018       PLEASE FAX REQUESTED RECORDS BACK TO: [phone]

## 2018-07-13 ENCOUNTER — HOSPITAL ENCOUNTER (OUTPATIENT)
Dept: LAB | Facility: MEDICAL CENTER | Age: 58
End: 2018-07-13
Attending: INTERNAL MEDICINE
Payer: COMMERCIAL

## 2018-07-13 DIAGNOSIS — Z12.5 SCREENING PSA (PROSTATE SPECIFIC ANTIGEN): ICD-10-CM

## 2018-07-13 DIAGNOSIS — E78.5 DYSLIPIDEMIA: ICD-10-CM

## 2018-07-13 LAB
ALBUMIN SERPL BCP-MCNC: 4.1 G/DL (ref 3.2–4.9)
ALBUMIN/GLOB SERPL: 1.2 G/DL
ALP SERPL-CCNC: 83 U/L (ref 30–99)
ALT SERPL-CCNC: 55 U/L (ref 2–50)
ANION GAP SERPL CALC-SCNC: 9 MMOL/L (ref 0–11.9)
AST SERPL-CCNC: 55 U/L (ref 12–45)
BILIRUB SERPL-MCNC: 1.4 MG/DL (ref 0.1–1.5)
BUN SERPL-MCNC: 19 MG/DL (ref 8–22)
CALCIUM SERPL-MCNC: 9.4 MG/DL (ref 8.5–10.5)
CHLORIDE SERPL-SCNC: 103 MMOL/L (ref 96–112)
CHOLEST SERPL-MCNC: 148 MG/DL (ref 100–199)
CO2 SERPL-SCNC: 26 MMOL/L (ref 20–33)
CREAT SERPL-MCNC: 0.77 MG/DL (ref 0.5–1.4)
GLOBULIN SER CALC-MCNC: 3.3 G/DL (ref 1.9–3.5)
GLUCOSE SERPL-MCNC: 77 MG/DL (ref 65–99)
HDLC SERPL-MCNC: 67 MG/DL
LDLC SERPL CALC-MCNC: 70 MG/DL
POTASSIUM SERPL-SCNC: 4.1 MMOL/L (ref 3.6–5.5)
PROT SERPL-MCNC: 7.4 G/DL (ref 6–8.2)
PSA SERPL-MCNC: 0.37 NG/ML (ref 0–4)
SODIUM SERPL-SCNC: 138 MMOL/L (ref 135–145)
TRIGL SERPL-MCNC: 55 MG/DL (ref 0–149)

## 2018-07-13 PROCEDURE — 36415 COLL VENOUS BLD VENIPUNCTURE: CPT

## 2018-07-13 PROCEDURE — 80061 LIPID PANEL: CPT

## 2018-07-13 PROCEDURE — 84153 ASSAY OF PSA TOTAL: CPT

## 2018-07-13 PROCEDURE — 80053 COMPREHEN METABOLIC PANEL: CPT

## 2018-07-26 ENCOUNTER — NON-PROVIDER VISIT (OUTPATIENT)
Dept: PULMONOLOGY | Facility: HOSPICE | Age: 58
End: 2018-07-26
Payer: COMMERCIAL

## 2018-07-26 ENCOUNTER — OFFICE VISIT (OUTPATIENT)
Dept: PULMONOLOGY | Facility: HOSPICE | Age: 58
End: 2018-07-26
Payer: COMMERCIAL

## 2018-07-26 VITALS
RESPIRATION RATE: 16 BRPM | TEMPERATURE: 98.1 F | OXYGEN SATURATION: 94 % | DIASTOLIC BLOOD PRESSURE: 70 MMHG | WEIGHT: 251 LBS | BODY MASS INDEX: 37.18 KG/M2 | HEIGHT: 69 IN | SYSTOLIC BLOOD PRESSURE: 116 MMHG | HEART RATE: 63 BPM

## 2018-07-26 VITALS — BODY MASS INDEX: 37.07 KG/M2 | WEIGHT: 251 LBS

## 2018-07-26 DIAGNOSIS — J30.9 ALLERGIC RHINITIS, UNSPECIFIED SEASONALITY, UNSPECIFIED TRIGGER: ICD-10-CM

## 2018-07-26 DIAGNOSIS — J45.30 MILD PERSISTENT ASTHMA WITHOUT COMPLICATION: ICD-10-CM

## 2018-07-26 DIAGNOSIS — J44.9 CHRONIC OBSTRUCTIVE PULMONARY DISEASE, UNSPECIFIED COPD TYPE (HCC): ICD-10-CM

## 2018-07-26 DIAGNOSIS — J45.20 MILD INTERMITTENT ASTHMA WITHOUT COMPLICATION: ICD-10-CM

## 2018-07-26 DIAGNOSIS — G47.33 OSA (OBSTRUCTIVE SLEEP APNEA): Chronic | ICD-10-CM

## 2018-07-26 PROCEDURE — 94729 DIFFUSING CAPACITY: CPT | Performed by: INTERNAL MEDICINE

## 2018-07-26 PROCEDURE — 94010 BREATHING CAPACITY TEST: CPT | Performed by: INTERNAL MEDICINE

## 2018-07-26 PROCEDURE — 94726 PLETHYSMOGRAPHY LUNG VOLUMES: CPT | Performed by: INTERNAL MEDICINE

## 2018-07-26 PROCEDURE — 99214 OFFICE O/P EST MOD 30 MIN: CPT | Performed by: NURSE PRACTITIONER

## 2018-07-26 RX ORDER — AZITHROMYCIN 250 MG/1
TABLET, FILM COATED ORAL
Qty: 6 TAB | Refills: 1 | Status: SHIPPED | OUTPATIENT
Start: 2018-07-26 | End: 2019-01-24

## 2018-07-26 RX ORDER — FLUTICASONE PROPIONATE 50 MCG
SPRAY, SUSPENSION (ML) NASAL
Qty: 1 BOTTLE | Refills: 5 | Status: SHIPPED | OUTPATIENT
Start: 2018-07-26 | End: 2020-07-02

## 2018-07-26 ASSESSMENT — PULMONARY FUNCTION TESTS
FEV1: 1.84
FEV1/FVC_PERCENT_LLN: 63
FEV1/FVC_PERCENT_PREDICTED: 79
FEV1_LLN: 2.78
FEV1_PREDICTED: 3.33
FEV1/FVC_PERCENT_PREDICTED: 76
FEV1_PERCENT_PREDICTED: 55
FVC_LLN: 3.67
FEV1/FVC_PERCENT_PREDICTED: 80
FVC: 3.06
FEV1/FVC_PREDICTED: 76
FEV1/FVC: 60
FVC_PREDICTED: 4.39
FEV1/FVC: 60
FVC_PERCENT_PREDICTED: 69

## 2018-07-26 NOTE — PROGRESS NOTES
Chief Complaint   Patient presents with   • Apnea   • Allergic Rhinitis     PFT       HPI:  Jude Jiménez is a 57 y.o. year old male here today for follow-up on his Asthma and Obstructive Sleep Apnea. He was referred back to our office by Cardiology for a reevaluation of his sleep apnea as he had recurrent atrial fibrillation. Prior sleep study indicated severe obstructive sleep apnea with an RDI of 96.9. He had been on CPAP at 12 CM H20. His old machine did not have AHI capability. He was ordered a new machine with Auto CPAP 10-16 CM H20. Overnight oximetry 10/23/2017 indicates a mean 02 of 90.1%. He had mild desaturations the first part of the study which appears to be related more to artifact. Compliance chip download today in the office indicates an AHI of 7.3 with an average use of over 8 hours at night and a peak average pressure of 11.9.   He has a full face mask. He notes frequent mask leaks. He tolerates the pressure. He does feel he sleeps better on therapy and wakes more refreshed. He denies any morning headaches.      He states he has had an episode of bronchitis in June. He required a course of Azithromycin which he feels worked well for him. He has a 10 PYH of tobacco abuse. He quit smoking in 1998.      Chest xray was completed 7/28/2017 and indicates clear lungs.   PFT's 7/20/2017 indicated an FEV1 of 1.77 L, 52% predicted with an FEV1/FVC ratio of 59 with a positive bronchodilator response and a DLCO of 140% predicted.   PFT's were updated today in the office and indicate an FEV1 of 1.84 L, 55% predicted with an FEV1/FVC ratio of 60 with a DLCO of 175% predicted. Post bronchodilator not performed due to use of Ventolin 1 hour prior to testing.      He notes sinus drainage which is often worse in the Spring months. He notes occasional wheezing with allergy and smoke triggers. He does cough and produces clear to yellow mucous primarily in the morning. He denies any fevers or chills. He notes  "mild dyspnea with exertion. He feels he recovers quickly with rest. He is compliant on Dulera 100/5 mcg 2 puffs bid along with a Ventolin HFA inhaler.      Echo 4/11/2017 indicated;     Technically difficult but adequate study for interpretation.   3 ml Optison contrast was used to enhance definition of the endocardial   borders.   Normal left ventricular size and systolic function. Mild concentric   left ventricular hypertrophy. Left ventricular ejection fraction is   visually estimated to be 60%. Diastolic function is difficult to assess   with atrial fibrillation.   Moderately dilated left atrium.  Compared to the report of the study done 7/2014 - there has been an   improvement in LVEF and estimated RVSP.       Past Medical History:   Diagnosis Date   • AF (atrial fibrillation) ([facility]) 6/24/2010   • Alcohol abuse    • Arthritis     \"rhematoid eyes\"   • Atrial fibrillation ([facility]) July 11, 2014    AF with RVR   • Bronchitis 6/25/14    ?   • Chickenpox    • Chronic anticoagulation    • Cold 6/25/14   • Eye pain    • Hearing difficulty    • Hypertension    • Infectious disease    • Obesity    • JACE (obstructive sleep apnea) 6/24/2010    uses CPAP   • Other emphysema ([facility])    • PAF (paroxysmal atrial fibrillation) ([facility]) 10/1/2014   • Pneumonia 6/25/14    ?   • Sleep apnea    • Snoring    • Tonsillitis    • Wheezing        Past Surgical History:   Procedure Laterality Date   • RECOVERY  8/7/2014    Performed by Cath-Recovery Surgery at SURGERY SAME DAY [facility]       Family History   Problem Relation Age of Onset   • Hypertension Mother        Social History     Social History   • Marital status:      Spouse name: N/A   • Number of children: N/A   • Years of education: N/A     Occupational History   • Not on file.     Social History Main Topics   • Smoking status: Former Smoker     Packs/day: 0.50     Years: 20.00     Types: Cigarettes     Quit date: 1/1/1998   • Smokeless tobacco: Never Used   • Alcohol use " 5.4 oz/week     6 Glasses of wine, 3 Shots of liquor per week      Comment: around 6 glasses of wine every day and 2-3 shots of tequilla   • Drug use: Yes     Types: Marijuana      Comment: Marijuana every 2 weeks   • Sexual activity: Not on file     Other Topics Concern   • Not on file     Social History Narrative   • No narrative on file       ROS:  Constitutional: Denies fevers, chills, sweats, weight loss  Eyes: Denies glasses, vision loss, pain, drainage, double vision  Ears/Nose/Mouth/Throat: Denies ear ache, difficulty hearing, sore throat, persistent hoarseness, decayed teeth/toothache  Cardiovascular: Denies chest pain, tightness, palpitations, swelling in feet/legs, fainting, difficulty breathing when laying down  Respiratory: See HPI   GI: Denies heartburn, difficulty swallowing, nausea, vomiting, abdominal pain, diarrhea, constipation  : Denies frequent urination, painful urination  Integumentary: Denies rashes, lumps or color changes  MSK: Denies painful joints, sore muscles, and back pain.   Neurological: Denies frequent headaches, dizziness, weakness  Sleep: See HPI       Current Outpatient Prescriptions   Medication Sig Dispense Refill   • atorvastatin (LIPITOR) 20 MG Tab Take 1 Tab by mouth every day. 90 Tab 3   • rivaroxaban (XARELTO) 20 MG Tab tablet Take 1 Tab by mouth with dinner. 30 Tab 11   • metoprolol SR (TOPROL XL) 100 MG TABLET SR 24 HR Take 1 Tab by mouth every day. 30 Tab 11   • Mometasone Furo-Formoterol Fum (DULERA) 100-5 MCG/ACT Aerosol Inhale 2 Puffs by mouth 2 Times a Day. Use spacer. Rinse mouth after use. 1 Inhaler 5   • albuterol (VENTOLIN HFA) 108 (90 Base) MCG/ACT Aero Soln inhalation aerosol Inhale 2 Puffs by mouth every four hours as needed for Shortness of Breath (Wheezing). 1 Inhaler 5     No current facility-administered medications for this visit.        No Known Allergies    Blood pressure 116/70, pulse 63, temperature 36.7 °C (98.1 °F), resp. rate 16, height 1.753 m  "(5' 9\"), weight 113.9 kg (251 lb), SpO2 94 %.    PE:   Appearance: Well developed, well nourished, no acute distress  Eyes: PERRL, EOM intact, sclera white, conjunctiva moist  Ears: no lesions or deformities  Hearing: grossly intact  Nose: no lesions or deformities  Oropharynx: tongue normal, posterior pharynx without erythema or exudate  Mallampati Classification: Class 3  Neck: supple, trachea midline, no masses   Respiratory effort: no intercostal retractions or use of accessory muscles  Lung auscultation: no rales, rhonchi or wheezes  Heart auscultation: no murmur rub or gallop  Extremities: no cyanosis or edema  Abdomen: soft ,non tender, no masses  Gait and Station: normal  Digits and nails: no clubbing, cyanosis, petechiae or nodes.  Cranial nerves: grossly intact  Skin: no rashes, lesions or ulcers noted  Orientation: Oriented to time, person and place  Mood and affect: mood and affect appropriate, normal interaction with examiner  Judgement: Intact          Assessment:    1. JACE (obstructive sleep apnea)  DME MASK AND SUPPLIES   2. Mild intermittent asthma without complication  azithromycin (ZITHROMAX) 250 MG Tab   3. Chronic obstructive pulmonary disease, unspecified COPD type (HCC)     4. Allergic rhinitis, unspecified seasonality, unspecified trigger  fluticasone (FLONASE) 50 MCG/ACT nasal spray   5. BMI 37.0-37.9, adult  Patient identified as having weight management issue.  Appropriate orders and counseling given.         Plan:    1) Continue Auto CPAP @ 10-16 CM H20. Order to DME to fit for new full face mask of choice, consider dreamwear FF.   2) Sleep hygiene discussed.    3) Weight loss recommended.  4) PFT's are stable. Continue Dulera and Ventolin HFA inhaler.   5) Encourage routine exercise. Discussed referral to Pulmonary Rehab. He will consider.   6) Up to date on Pneumovax 23. Recommend updated Influenza vaccine in the Fall.  7) Continue to follow with Cardiology.   8) Add Flonase nasal spray " 1-2 sprays/nostril qhs.   9) Follow up in 6 months, sooner if needed.

## 2018-07-26 NOTE — PROCEDURES
Technician Yasmin Stratton, [facility] Comments:Good patient effort & cooperation.  The results of this test meet the ATS/ERS standards for acceptability & reproducibility.  Test was performed on the [employer] Body Plethysmograph-Elite DX system.  Predicted values were [facility]-3 for spirometry, [facility] for DLCO, ITS for Lung Volumes.  The DLCO was uncorrected for Hgb. DLCO performed during dilation period. Pt used inhaler <1 hour prior to testing, no POST-FVC completed.       The FVC is 3.06 L or 69%, FEV1 is 1.84 L or 55%, FEV1/FVC 60%.  TLC of 98%.  DLCO 175%.    Interpretation:  PFT shows moderately severe obstructive ventilatory defect.  Increased diffusion capacity which can be seen in asthma.

## 2018-07-26 NOTE — PATIENT INSTRUCTIONS
Plan:    1) Continue Auto CPAP @ 10-16 CM H20. Order to DME to fit for new full face mask of choice, consider dreamwear FF.   2) Sleep hygiene discussed.    3) Weight loss recommended.  4) PFT's are stable. Continue Dulera and Ventolin HFA inhaler.   5) Encourage routine exercise. Discussed referral to Pulmonary Rehab. He will consider.   6) Up to date on Pneumovax 23. Recommend updated Influenza vaccine in the Fall.  7) Continue to follow with Cardiology.   8) Add Flonase nasal spray 1-2 sprays/nostril qhs.   9) Follow up in 6 months, sooner if needed.

## 2018-08-09 ENCOUNTER — OFFICE VISIT (OUTPATIENT)
Dept: MEDICAL GROUP | Facility: MEDICAL CENTER | Age: 58
End: 2018-08-09
Payer: COMMERCIAL

## 2018-08-09 VITALS
WEIGHT: 249 LBS | HEIGHT: 69 IN | RESPIRATION RATE: 16 BRPM | SYSTOLIC BLOOD PRESSURE: 118 MMHG | OXYGEN SATURATION: 95 % | DIASTOLIC BLOOD PRESSURE: 68 MMHG | BODY MASS INDEX: 36.88 KG/M2 | TEMPERATURE: 98.1 F | HEART RATE: 85 BPM

## 2018-08-09 DIAGNOSIS — J44.9 CHRONIC OBSTRUCTIVE PULMONARY DISEASE, UNSPECIFIED COPD TYPE (HCC): ICD-10-CM

## 2018-08-09 DIAGNOSIS — Z12.5 SCREENING PSA (PROSTATE SPECIFIC ANTIGEN): ICD-10-CM

## 2018-08-09 DIAGNOSIS — R39.12 BENIGN PROSTATIC HYPERPLASIA WITH WEAK URINARY STREAM: ICD-10-CM

## 2018-08-09 DIAGNOSIS — F10.10 ALCOHOL ABUSE: ICD-10-CM

## 2018-08-09 DIAGNOSIS — N40.1 BENIGN PROSTATIC HYPERPLASIA WITH WEAK URINARY STREAM: ICD-10-CM

## 2018-08-09 DIAGNOSIS — R79.89 LFT ELEVATION: ICD-10-CM

## 2018-08-09 PROCEDURE — 99214 OFFICE O/P EST MOD 30 MIN: CPT | Performed by: INTERNAL MEDICINE

## 2018-08-09 NOTE — PROGRESS NOTES
CC: Follow-up COPD, weakened urinary stream, abnormal labs.    HPI:   Jude presents today with the following.    1. Chronic obstructive pulmonary disease, unspecified COPD type (HCC)  Patient has been maintained on inhalers recently PFTs by pulmonology showing COPD and components of asthma.  He is maintained on inhalers reporting his breathing is at baseline despite smoke and air.    2.  weak urinary stream  Does have complaints of weakened urinary stream.  He sometimes gets up once per night but denies any major life inconveniences based on symptoms.  Denies any pain no blood in his urine    3. LFT elevation  Blood work shows AST and ALT both elevated at 55.  He reports for the last month he has not drink any hard alcohol and is only having 2 drinks per night.  Denies any risk factors for other disease process viral hepatitis screening was negative    4. Alcohol abuse  As mentioned above alcohol is cut down still having 2 drinks per night.    5. BMI 36.0-36.9,adult  Weight down slightly encouraged further efforts for weight loss.  He has not made any conscious decisions about changing his diet.        Patient Active Problem List    Diagnosis Date Noted   • Alcohol abuse      Priority: High   • JACE (obstructive sleep apnea) 06/24/2010     Priority: High   • Chronic anticoagulation      Priority: Medium   • Chronic obstructive pulmonary disease (HCC) 08/09/2018   • Benign prostatic hyperplasia with weak urinary stream 08/09/2018   • Persistent atrial fibrillation (HCC) 05/19/2017   • IGT (impaired glucose tolerance) 11/10/2016   • Iritis 03/17/2011       Current Outpatient Prescriptions   Medication Sig Dispense Refill   • fluticasone (FLONASE) 50 MCG/ACT nasal spray 1-2 sprays/nostril qhs 1 Bottle 5   • atorvastatin (LIPITOR) 20 MG Tab Take 1 Tab by mouth every day. 90 Tab 3   • rivaroxaban (XARELTO) 20 MG Tab tablet Take 1 Tab by mouth with dinner. 30 Tab 11   • metoprolol SR (TOPROL XL) 100 MG TABLET SR 24 HR  "Take 1 Tab by mouth every day. 30 Tab 11   • Mometasone Furo-Formoterol Fum (DULERA) 100-5 MCG/ACT Aerosol Inhale 2 Puffs by mouth 2 Times a Day. Use spacer. Rinse mouth after use. 1 Inhaler 5   • albuterol (VENTOLIN HFA) 108 (90 Base) MCG/ACT Aero Soln inhalation aerosol Inhale 2 Puffs by mouth every four hours as needed for Shortness of Breath (Wheezing). 1 Inhaler 5   • azithromycin (ZITHROMAX) 250 MG Tab Take 2 tablets on day 1, then take 1 tablet a day for 4 days. (Patient not taking: Reported on 8/9/2018) 6 Tab 1     No current facility-administered medications for this visit.          Allergies as of 08/09/2018   • (No Known Allergies)        ROS: Denies Chest pain, SOB, LE edema.    /68   Pulse 85   Temp 36.7 °C (98.1 °F)   Resp 16   Ht 1.753 m (5' 9\")   Wt 112.9 kg (249 lb)   SpO2 95%   BMI 36.77 kg/m²     Physical Exam:  Gen:         Alert and oriented, No apparent distress.  Neck:        No Lymphadenopathy or Bruits.  Lungs:     Clear to auscultation bilaterally  CV:          Regular rate and rhythm. No murmurs, rubs or gallops.               Ext:          No clubbing, cyanosis, edema.      Assessment and Plan.   57 y.o. male with the following issues.    1. Chronic obstructive pulmonary disease, unspecified COPD type (HCC)  Stable continue inhalers    2. Benign prostatic hyperplasia with weak urinary stream  Symptoms consistent with mild BPH discussion about lifestyle modification    3. LFT elevation  As related to alcohol he has cut down since blood work repeat labs in the coming months.  If continually elevated consider ultrasound  - COMP METABOLIC PANEL; Future    4. Alcohol abuse  Recommendations for cessation    5. BMI 36.0-36.9,adult  Patient's body mass index is 36.77 kg/m². Exercise and nutrition counseling were performed at this visit.  Set goal of 15lbs in next 6 months.      6. Screening PSA (prostate specific antigen)    - PROSTATE SPECIFIC AG SCREENING; Future      "

## 2019-01-01 DIAGNOSIS — J45.909 UNCOMPLICATED ASTHMA, UNSPECIFIED ASTHMA SEVERITY, UNSPECIFIED WHETHER PERSISTENT: ICD-10-CM

## 2019-01-02 RX ORDER — MOMETASONE FUROATE AND FORMOTEROL FUMARATE DIHYDRATE 100; 5 UG/1; UG/1
AEROSOL RESPIRATORY (INHALATION)
Qty: 3 INHALER | Refills: 3 | Status: SHIPPED | OUTPATIENT
Start: 2019-01-02 | End: 2020-03-05 | Stop reason: SDUPTHER

## 2019-01-02 NOTE — TELEPHONE ENCOUNTER
Have we ever prescribed this med? Yes.  If yes, what date? 11/16/2017    Last OV: 07/26/2018 - Nargis Kumar    Next OV: 01/24/2019 - Nargis Kumar    DX: ASTHMA    Medications: DULERA

## 2019-01-14 ENCOUNTER — HOSPITAL ENCOUNTER (OUTPATIENT)
Dept: LAB | Facility: MEDICAL CENTER | Age: 59
End: 2019-01-14
Attending: INTERNAL MEDICINE
Payer: COMMERCIAL

## 2019-01-14 DIAGNOSIS — R79.89 LFT ELEVATION: ICD-10-CM

## 2019-01-14 DIAGNOSIS — Z12.5 SCREENING PSA (PROSTATE SPECIFIC ANTIGEN): ICD-10-CM

## 2019-01-14 LAB
ALBUMIN SERPL BCP-MCNC: 4.3 G/DL (ref 3.2–4.9)
ALBUMIN/GLOB SERPL: 1.2 G/DL
ALP SERPL-CCNC: 73 U/L (ref 30–99)
ALT SERPL-CCNC: 40 U/L (ref 2–50)
ANION GAP SERPL CALC-SCNC: 8 MMOL/L (ref 0–11.9)
AST SERPL-CCNC: 35 U/L (ref 12–45)
BILIRUB SERPL-MCNC: 1 MG/DL (ref 0.1–1.5)
BUN SERPL-MCNC: 20 MG/DL (ref 8–22)
CALCIUM SERPL-MCNC: 9.6 MG/DL (ref 8.5–10.5)
CHLORIDE SERPL-SCNC: 103 MMOL/L (ref 96–112)
CO2 SERPL-SCNC: 26 MMOL/L (ref 20–33)
CREAT SERPL-MCNC: 0.82 MG/DL (ref 0.5–1.4)
FASTING STATUS PATIENT QL REPORTED: NORMAL
GLOBULIN SER CALC-MCNC: 3.5 G/DL (ref 1.9–3.5)
GLUCOSE SERPL-MCNC: 86 MG/DL (ref 65–99)
POTASSIUM SERPL-SCNC: 4.4 MMOL/L (ref 3.6–5.5)
PROT SERPL-MCNC: 7.8 G/DL (ref 6–8.2)
PSA SERPL-MCNC: 0.6 NG/ML (ref 0–4)
SODIUM SERPL-SCNC: 137 MMOL/L (ref 135–145)

## 2019-01-14 PROCEDURE — 36415 COLL VENOUS BLD VENIPUNCTURE: CPT

## 2019-01-14 PROCEDURE — 84153 ASSAY OF PSA TOTAL: CPT

## 2019-01-14 PROCEDURE — 80053 COMPREHEN METABOLIC PANEL: CPT

## 2019-01-24 ENCOUNTER — OFFICE VISIT (OUTPATIENT)
Dept: PULMONOLOGY | Facility: HOSPICE | Age: 59
End: 2019-01-24
Payer: COMMERCIAL

## 2019-01-24 VITALS
RESPIRATION RATE: 16 BRPM | TEMPERATURE: 97.9 F | BODY MASS INDEX: 34.8 KG/M2 | SYSTOLIC BLOOD PRESSURE: 140 MMHG | HEART RATE: 79 BPM | HEIGHT: 69 IN | DIASTOLIC BLOOD PRESSURE: 76 MMHG | WEIGHT: 235 LBS | OXYGEN SATURATION: 95 %

## 2019-01-24 DIAGNOSIS — Z23 NEED FOR INFLUENZA VACCINATION: ICD-10-CM

## 2019-01-24 DIAGNOSIS — J45.20 MILD INTERMITTENT ASTHMA WITHOUT COMPLICATION: ICD-10-CM

## 2019-01-24 DIAGNOSIS — J30.9 ALLERGIC RHINITIS, UNSPECIFIED SEASONALITY, UNSPECIFIED TRIGGER: ICD-10-CM

## 2019-01-24 DIAGNOSIS — J44.9 CHRONIC OBSTRUCTIVE PULMONARY DISEASE, UNSPECIFIED COPD TYPE (HCC): ICD-10-CM

## 2019-01-24 DIAGNOSIS — G47.33 OSA (OBSTRUCTIVE SLEEP APNEA): Chronic | ICD-10-CM

## 2019-01-24 PROCEDURE — 99214 OFFICE O/P EST MOD 30 MIN: CPT | Performed by: NURSE PRACTITIONER

## 2019-01-24 NOTE — PATIENT INSTRUCTIONS
Plan:    1) Continue Auto CPAP @ 10-16 CM H20. Request replacement compliance chip and requested download from his DME.   2) Sleep hygiene discussed.   3) Weight loss recommended.  4) Continue Dulera and Ventolin inhalers.  5) Encouraged routine walking/exercise. Offered referral to Pulmonary Rehab in the past. Pt will consider.  6) Up to date on Pneumovax 23. Recommend updated Influenza vaccination. Pt states he will get. However we are out of Flu vaccines in the clinic.   7) Continue prn Flonase nasal spray.  8) Continue to follow with Cardiology.   9) He does keep a Zpak on hand.   10) 6 month follow up with compliance download, sooner OV if needed.

## 2019-01-24 NOTE — PROGRESS NOTES
Chief Complaint   Patient presents with   • Apnea   • Allergic Rhinitis     Last seen 7/26/18       HPI:  Jude Jiménez is a 58 y.o. year old male here today for follow-up on his Asthma and Obstructive Sleep Apnea.     He was referred back to our office by Cardiology for a reevaluation of his sleep apnea as he had recurrent atrial fibrillation. Prior sleep study indicated severe obstructive sleep apnea with an RDI of 96.9. He had been on CPAP at 12 CM H20. His old machine did not have AHI capability. He was ordered a new machine with Auto CPAP 10-16 CM H20. Overnight oximetry 10/23/2017 indicates a mean 02 of 90.1%. He had mild desaturations the first part of the study which appears to be related more to artifact. Compliance chip download at his last office visit indicated an AHI of 7.3 with an average use of over 8 hours at night and a peak average pressure of 11.9. He noted frequent mask leaks. An order was sent to his DME to fit him for a new full face mask. He states his DME never fit him for a new mask. He is currently using a full face mask. He states he has adjusted his straps and feels he has been able to get a good seal. He does note occasional leaks when he rolls over during sleep. He does feel he sleeps better on therapy and wakes more refreshed. He denies any morning headaches. He brought his chip back today, but we were unable to download any information off the chip.        He has a 10 PYH of tobacco abuse. He quit smoking in 1998.      Chest xray was completed 7/28/2017 and indicates clear lungs.   PFT's 7/20/2017 indicated an FEV1 of 1.77 L, 52% predicted with an FEV1/FVC ratio of 59 with a positive bronchodilator response and a DLCO of 140% predicted.   PFT's at his last office visit 7/26/2018 indicated an FEV1 of 1.84 L, 55% predicted with an FEV1/FVC ratio of 60 with a DLCO of 175% predicted. Post bronchodilator not performed due to use of Ventolin 1 hour prior to testing.      He notes  "sinus drainage which is often worse in the Spring months. He notes occasional wheezing with allergy and smoke triggers. He notes occasional dyspnea with significant exertion. He notes an occasional cough with clear to light mucous generally only in the mornings. He notes a seldom wheeze. He denies any fevers or chills. He denies any recent exacerbations. He is compliant on Dulera 100/5 mcg 2 puffs bid along with a Ventolin HFA inhaler.           Past Medical History:   Diagnosis Date   • AF (atrial fibrillation) ([facility]) 6/24/2010   • Alcohol abuse    • Arthritis     \"rhematoid eyes\"   • Atrial fibrillation ([facility]) July 11, 2014    AF with RVR   • Bronchitis 6/25/14    ?   • Chickenpox    • Chronic anticoagulation    • Cold 6/25/14   • Eye pain    • Hearing difficulty    • Hypertension    • Infectious disease    • Obesity    • JACE (obstructive sleep apnea) 6/24/2010    uses CPAP   • Other emphysema ([facility])    • PAF (paroxysmal atrial fibrillation) ([facility]) 10/1/2014   • Pneumonia 6/25/14    ?   • Sleep apnea    • Snoring    • Tonsillitis    • Wheezing        Past Surgical History:   Procedure Laterality Date   • RECOVERY  8/7/2014    Performed by Cath-Recovery Surgery at SURGERY SAME DAY [facility] ORS       Family History   Problem Relation Age of Onset   • Hypertension Mother        Social History     Social History   • Marital status:      Spouse name: N/A   • Number of children: N/A   • Years of education: N/A     Occupational History   • Not on file.     Social History Main Topics   • Smoking status: Former Smoker     Packs/day: 0.50     Years: 20.00     Types: Cigarettes     Quit date: 1/1/1998   • Smokeless tobacco: Never Used   • Alcohol use 5.4 oz/week     6 Glasses of wine, 3 Shots of liquor per week      Comment: around 6 glasses of wine every day and 2-3 shots of tequilla   • Drug use: Yes     Types: Marijuana      Comment: Marijuana every 2 weeks   • Sexual activity: Not on file     Other Topics Concern   • " "Not on file     Social History Narrative   • No narrative on file       ROS:  Constitutional: Denies fevers, chills, sweats, weight loss  Eyes: Denies glasses, vision loss, pain, drainage, double vision  Ears/Nose/Mouth/Throat: Denies ear ache, difficulty hearing, sore throat, persistent hoarseness, decayed teeth/toothache  Cardiovascular: Denies chest pain, tightness, palpitations, swelling in feet/legs, fainting, difficulty breathing when laying down  Respiratory: See HPI   GI: Denies heartburn, difficulty swallowing, nausea, vomiting, abdominal pain, diarrhea, constipation  : Denies frequent urination, painful urination  Integumentary: Denies rashes, lumps or color changes  MSK: Denies painful joints, sore muscles, and back pain.   Neurological: Denies frequent headaches, dizziness, weakness  Sleep: See HPI       Current Outpatient Prescriptions   Medication Sig Dispense Refill   • DULERA 100-5 MCG/ACT Aerosol INHALE TWO PUFFS BY MOUTH TWICE DAILY .  USE  WITH  SPACER.  RINSE  MOUTH  AFTER  USE 3 Inhaler 3   • atorvastatin (LIPITOR) 20 MG Tab Take 1 Tab by mouth every day. 90 Tab 3   • rivaroxaban (XARELTO) 20 MG Tab tablet Take 1 Tab by mouth with dinner. 30 Tab 11   • metoprolol SR (TOPROL XL) 100 MG TABLET SR 24 HR Take 1 Tab by mouth every day. 30 Tab 11   • albuterol (VENTOLIN HFA) 108 (90 Base) MCG/ACT Aero Soln inhalation aerosol Inhale 2 Puffs by mouth every four hours as needed for Shortness of Breath (Wheezing). 1 Inhaler 5   • fluticasone (FLONASE) 50 MCG/ACT nasal spray 1-2 sprays/nostril qhs 1 Bottle 5     No current facility-administered medications for this visit.        No Known Allergies    Blood pressure 140/76, pulse 79, temperature 36.6 °C (97.9 °F), temperature source Temporal, resp. rate 16, height 1.753 m (5' 9\"), weight 106.6 kg (235 lb), SpO2 95 %.    PE:   Appearance: Well developed, well nourished, no acute distress  Eyes: PERRL, EOM intact, sclera white, conjunctiva moist  Ears: no " lesions or deformities  Hearing: grossly intact  Nose: no lesions or deformities  Oropharynx: tongue normal, posterior pharynx without erythema or exudate  Mallampati Classification: Class 3   Neck: supple, trachea midline, no masses   Respiratory effort: no intercostal retractions or use of accessory muscles  Lung auscultation: no rales, rhonchi or wheezes  Heart auscultation: no murmur rub or gallop  Extremities: no cyanosis or edema  Abdomen: soft ,non tender, no masses  Gait and Station: normal  Digits and nails: no clubbing, cyanosis, petechiae or nodes.  Cranial nerves: grossly intact  Skin: no rashes, lesions or ulcers noted  Orientation: Oriented to time, person and place  Mood and affect: mood and affect appropriate, normal interaction with examiner  Judgement: Intact          Assessment:  1. JACE (obstructive sleep apnea)  DME Other   2. Mild intermittent asthma without complication  PULMONARY FUNCTION TESTS -Test requested: Complete Pulmonary Function Test; Include MIPS/MEPS? No   3. Chronic obstructive pulmonary disease, unspecified COPD type (HCC)  PULMONARY FUNCTION TESTS -Test requested: Complete Pulmonary Function Test; Include MIPS/MEPS? No   4. Allergic rhinitis, unspecified seasonality, unspecified trigger     5. BMI 34.0-34.9,adult     6. Need for influenza vaccination  CANCELED: Influenza Vaccine Quad Injection >3Y (PF)         Plan:    1) Continue Auto CPAP @ 10-16 CM H20. Request replacement compliance chip and requested download from his DME.   2) Sleep hygiene discussed.   3) Weight loss recommended.  4) Continue Dulera and Ventolin inhalers.  5) Encouraged routine walking/exercise. Offered referral to Pulmonary Rehab in the past. Pt will consider.  6) Up to date on Pneumovax 23. Recommend updated Influenza vaccination. Pt states he will get. However we are out of Flu vaccines in the clinic.   7) Continue prn Flonase nasal spray.  8) Continue to follow with Cardiology.   9) He does keep a  Zpak on hand.   10) 6 month follow up with compliance download, sooner OV if needed.

## 2019-02-11 ENCOUNTER — OFFICE VISIT (OUTPATIENT)
Dept: MEDICAL GROUP | Facility: MEDICAL CENTER | Age: 59
End: 2019-02-11
Payer: COMMERCIAL

## 2019-02-11 VITALS
WEIGHT: 254 LBS | HEIGHT: 69 IN | SYSTOLIC BLOOD PRESSURE: 134 MMHG | TEMPERATURE: 97 F | OXYGEN SATURATION: 97 % | BODY MASS INDEX: 37.62 KG/M2 | DIASTOLIC BLOOD PRESSURE: 86 MMHG | HEART RATE: 79 BPM

## 2019-02-11 DIAGNOSIS — R39.12 BENIGN PROSTATIC HYPERPLASIA WITH WEAK URINARY STREAM: ICD-10-CM

## 2019-02-11 DIAGNOSIS — E78.5 DYSLIPIDEMIA: ICD-10-CM

## 2019-02-11 DIAGNOSIS — Z23 NEED FOR VACCINATION: ICD-10-CM

## 2019-02-11 DIAGNOSIS — J44.9 CHRONIC OBSTRUCTIVE PULMONARY DISEASE, UNSPECIFIED COPD TYPE (HCC): ICD-10-CM

## 2019-02-11 DIAGNOSIS — I48.19 PERSISTENT ATRIAL FIBRILLATION (HCC): ICD-10-CM

## 2019-02-11 DIAGNOSIS — N40.1 BENIGN PROSTATIC HYPERPLASIA WITH WEAK URINARY STREAM: ICD-10-CM

## 2019-02-11 PROCEDURE — 99214 OFFICE O/P EST MOD 30 MIN: CPT | Mod: 25 | Performed by: INTERNAL MEDICINE

## 2019-02-11 PROCEDURE — 90471 IMMUNIZATION ADMIN: CPT | Performed by: INTERNAL MEDICINE

## 2019-02-11 PROCEDURE — 90686 IIV4 VACC NO PRSV 0.5 ML IM: CPT | Performed by: INTERNAL MEDICINE

## 2019-02-11 NOTE — PROGRESS NOTES
CC: Follow-up COPD, atrial fibrillation, BPH.    HPI:   Jude presents today with the following.    1. Chronic obstructive pulmonary disease, unspecified COPD type (HCC)  Presents reporting he is using his inhaler twice daily as of late.  He is maintained on maintenance inhaler followed by pulmonology.  Denies any fevers or chills no increased shortness of breath at the moment but reports it has been a rough winter.    2. Persistent atrial fibrillation (HCC)  Denying any chest pains or palpitations.    3. Benign prostatic hyperplasia with weak urinary stream  Reports getting up once at night and occasional urgency but nothing that is progressed to needing medications.    4. Dyslipidemia  Maintain on statin without myalgia    5. Need for vaccination        Patient Active Problem List    Diagnosis Date Noted   • Alcohol abuse      Priority: High   • JACE (obstructive sleep apnea) 06/24/2010     Priority: High   • Chronic anticoagulation      Priority: Medium   • Dyslipidemia 02/11/2019   • Chronic obstructive pulmonary disease (HCC) 08/09/2018   • Benign prostatic hyperplasia with weak urinary stream 08/09/2018   • Persistent atrial fibrillation (HCC) 05/19/2017   • IGT (impaired glucose tolerance) 11/10/2016   • Iritis 03/17/2011       Current Outpatient Prescriptions   Medication Sig Dispense Refill   • DULERA 100-5 MCG/ACT Aerosol INHALE TWO PUFFS BY MOUTH TWICE DAILY .  USE  WITH  SPACER.  RINSE  MOUTH  AFTER  USE 3 Inhaler 3   • fluticasone (FLONASE) 50 MCG/ACT nasal spray 1-2 sprays/nostril qhs 1 Bottle 5   • atorvastatin (LIPITOR) 20 MG Tab Take 1 Tab by mouth every day. 90 Tab 3   • rivaroxaban (XARELTO) 20 MG Tab tablet Take 1 Tab by mouth with dinner. 30 Tab 11   • metoprolol SR (TOPROL XL) 100 MG TABLET SR 24 HR Take 1 Tab by mouth every day. 30 Tab 11   • albuterol (VENTOLIN HFA) 108 (90 Base) MCG/ACT Aero Soln inhalation aerosol Inhale 2 Puffs by mouth every four hours as needed for Shortness of Breath  "(Wheezing). 1 Inhaler 5     No current facility-administered medications for this visit.          Allergies as of 02/11/2019   • (No Known Allergies)        ROS: Denies Chest pain, SOB, LE edema.    /86 (BP Location: Left arm, Patient Position: Sitting)   Pulse 79   Temp 36.1 °C (97 °F)   Ht 1.753 m (5' 9\")   Wt 115.2 kg (254 lb)   SpO2 97%   BMI 37.51 kg/m²     Physical Exam:  Gen:         Alert and oriented, No apparent distress.  Neck:        No Lymphadenopathy or Bruits.  Lungs:     Scattered wheezes  CV:          Regular rate and rhythm. No murmurs, rubs or gallops.               Ext:          No clubbing, cyanosis, edema.      Assessment and Plan.   58 y.o. male with the following issues.    1. Chronic obstructive pulmonary disease, unspecified COPD type (HCC)  Increasing his Dulera to 200 mcg dose no other changes to medications.  Continue to follow with pulmonology    2. Persistent atrial fibrillation (HCC)  Stable no change of therapy    3. Benign prostatic hyperplasia with weak urinary stream  Lifestyle changes no need for medications currently    4. Dyslipidemia  Recheck cholesterol  - Comp Metabolic Panel; Future  - Lipid Profile; Future    5. Need for vaccination    - Influenza Vaccine Quad Injection >3Y (PF)      "

## 2019-02-15 DIAGNOSIS — J45.30 MILD PERSISTENT ASTHMA WITHOUT COMPLICATION: ICD-10-CM

## 2019-02-15 RX ORDER — ALBUTEROL SULFATE 90 UG/1
AEROSOL, METERED RESPIRATORY (INHALATION)
Qty: 1 INHALER | Refills: 5 | Status: SHIPPED | OUTPATIENT
Start: 2019-02-15 | End: 2020-01-16

## 2019-02-15 NOTE — TELEPHONE ENCOUNTER
Have we ever prescribed this med? Yes.  If yes, what date?  10/23/2017    Last OV: 01/24/2019    Next OV: 07/25/2019    DX: Mild persistent asthma without complication (J45.30)    Medications: albuterol (VENTOLIN HFA) 108 (90 Base) MCG/ACT Aero Soln inhalation aerosol

## 2019-03-29 DIAGNOSIS — Z79.01 CHRONIC ANTICOAGULATION: ICD-10-CM

## 2019-03-29 DIAGNOSIS — I48.0 PAF (PAROXYSMAL ATRIAL FIBRILLATION) (HCC): ICD-10-CM

## 2019-03-29 RX ORDER — METOPROLOL SUCCINATE 100 MG/1
100 TABLET, EXTENDED RELEASE ORAL DAILY
Qty: 90 TAB | Refills: 0 | Status: SHIPPED | OUTPATIENT
Start: 2019-03-29 | End: 2019-07-10 | Stop reason: SDUPTHER

## 2019-04-03 ENCOUNTER — OFFICE VISIT (OUTPATIENT)
Dept: CARDIOLOGY | Facility: MEDICAL CENTER | Age: 59
End: 2019-04-03
Payer: COMMERCIAL

## 2019-04-03 ENCOUNTER — TELEPHONE (OUTPATIENT)
Dept: CARDIOLOGY | Facility: MEDICAL CENTER | Age: 59
End: 2019-04-03

## 2019-04-03 VITALS
OXYGEN SATURATION: 95 % | SYSTOLIC BLOOD PRESSURE: 138 MMHG | BODY MASS INDEX: 37.47 KG/M2 | HEART RATE: 84 BPM | HEIGHT: 69 IN | WEIGHT: 253 LBS | DIASTOLIC BLOOD PRESSURE: 80 MMHG

## 2019-04-03 DIAGNOSIS — I48.19 PERSISTENT ATRIAL FIBRILLATION (HCC): ICD-10-CM

## 2019-04-03 DIAGNOSIS — E78.5 DYSLIPIDEMIA: ICD-10-CM

## 2019-04-03 DIAGNOSIS — Z79.01 CHRONIC ANTICOAGULATION: ICD-10-CM

## 2019-04-03 PROCEDURE — 99213 OFFICE O/P EST LOW 20 MIN: CPT | Performed by: NURSE PRACTITIONER

## 2019-04-03 ASSESSMENT — ENCOUNTER SYMPTOMS
CLAUDICATION: 0
FEVER: 0
WHEEZING: 0
NAUSEA: 0
COUGH: 0
ORTHOPNEA: 0
PND: 0
SPUTUM PRODUCTION: 0
HEADACHES: 0
CHILLS: 0
PALPITATIONS: 0
DIZZINESS: 0
SHORTNESS OF BREATH: 0
HEMOPTYSIS: 0
VOMITING: 0

## 2019-04-03 NOTE — PROGRESS NOTES
"Chief Complaint   Patient presents with   • Atrial Fibrillation       Subjective:   Jude Jiménez is a 58 y.o. male who presents today for persistent atrial fibrillation.  The patient is compliant with metoprolol  mg daily and rivaroxaban 20 mg every evening meal.  The patient reports no palpitations, chest pain, dizziness, or lightheadedness.  He denies shortness of breath and cough.    In the past the patient has been seen by Dr. Vides who gave him options for cardioversion versus rate control.  At that time he is not interested in DC CV.  He seems to be doing quite well on rate control.    The patient has a history of obstructive sleep apnea.  He has been compliant with CPAP.  His compliance card from January 2019 shows he is using his machine every night for over 9 hours.  His AHI has been reduced to 2.8.    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  Even light of these challenging jobs, he has had no chest pain or palpitations.    Past Medical History:   Diagnosis Date   • AF (atrial fibrillation) ([facility]) 6/24/2010   • Alcohol abuse    • Arthritis     \"rhematoid eyes\"   • Atrial fibrillation ([facility]) July 11, 2014    AF with RVR   • Bronchitis 6/25/14    ?   • Chickenpox    • Chronic anticoagulation    • Cold 6/25/14   • Eye pain    • Hearing difficulty    • Hypertension    • Infectious disease    • Obesity    • JACE (obstructive sleep apnea) 6/24/2010    uses CPAP   • Other emphysema ([facility])    • PAF (paroxysmal atrial fibrillation) ([facility]) 10/1/2014   • Pneumonia 6/25/14    ?   • Sleep apnea    • Snoring    • Tonsillitis    • Wheezing      Past Surgical History:   Procedure Laterality Date   • RECOVERY  8/7/2014    Performed by Cath-Recovery Surgery at SURGERY SAME DAY [facility]     Family History   Problem Relation Age of Onset   • Hypertension Mother      Social History     Social History   • Marital status:      Spouse name: N/A   • Number of " children: N/A   • Years of education: N/A     Occupational History   • Not on file.     Social History Main Topics   • Smoking status: Former Smoker     Packs/day: 0.50     Years: 20.00     Types: Cigarettes     Quit date: 1/1/1998   • Smokeless tobacco: Never Used   • Alcohol use 5.4 oz/week     6 Glasses of wine, 3 Shots of liquor per week      Comment: around 6 glasses of wine every day and 2-3 shots of tequilla   • Drug use: Yes     Types: Marijuana      Comment: Marijuana every 2 weeks   • Sexual activity: Not on file     Other Topics Concern   • Not on file     Social History Narrative   • No narrative on file     No Known Allergies  Outpatient Encounter Prescriptions as of 4/3/2019   Medication Sig Dispense Refill   • metoprolol SR (TOPROL XL) 100 MG TABLET SR 24 HR Take 1 Tab by mouth every day. Needs to be seen for further refills. Thank you 90 Tab 0   • rivaroxaban (XARELTO) 20 MG Tab tablet Take 1 Tab by mouth with dinner. Needs to be seen for further refills. Thank you 90 Tab 0   • albuterol 108 (90 Base) MCG/ACT Aero Soln inhalation aerosol INHALE TWO PUFFS BY MOUTH EVERY 4 HOURS AS NEEDED FOR SHORTNESS OF BREATH (WHEEZING) 1 Inhaler 5   • DULERA 100-5 MCG/ACT Aerosol INHALE TWO PUFFS BY MOUTH TWICE DAILY .  USE  WITH  SPACER.  RINSE  MOUTH  AFTER  USE 3 Inhaler 3   • fluticasone (FLONASE) 50 MCG/ACT nasal spray 1-2 sprays/nostril qhs 1 Bottle 5   • atorvastatin (LIPITOR) 20 MG Tab Take 1 Tab by mouth every day. 90 Tab 3   • Mometasone Furo-Formoterol Fum (DULERA) 200-5 MCG/ACT Aerosol Inhale 2 Puffs by mouth 2 Times a Day. (Patient not taking: Reported on 4/3/2019) 3 Inhaler 3     No facility-administered encounter medications on file as of 4/3/2019.      Review of Systems   Constitutional: Negative for chills and fever.   Respiratory: Negative for cough, hemoptysis, sputum production, shortness of breath and wheezing.    Cardiovascular: Negative for chest pain, palpitations, orthopnea, claudication,  "leg swelling and PND.   Gastrointestinal: Negative for nausea and vomiting.   Neurological: Negative for dizziness and headaches.   All other systems reviewed and are negative.       Objective:   /80 (BP Location: Left arm, Patient Position: Sitting, BP Cuff Size: Adult)   Pulse 84   Ht 1.753 m (5' 9\")   Wt 114.8 kg (253 lb)   SpO2 95%   BMI 37.36 kg/m²     Physical Exam   Constitutional: He is oriented to person, place, and time. He appears well-developed and well-nourished.   HENT:   Head: Normocephalic and atraumatic.   Eyes: EOM are normal.   Neck: Neck supple.   Cardiovascular: Normal rate and normal heart sounds.  An irregular rhythm present.   No murmur heard.  Pulses:       Radial pulses are 2+ on the right side, and 2+ on the left side.   Pulmonary/Chest: Effort normal and breath sounds normal.   Abdominal: Soft.   Musculoskeletal: He exhibits no edema.   Neurological: He is alert and oriented to person, place, and time.   Skin: Skin is warm and dry.   Psychiatric: He has a normal mood and affect. His behavior is normal. Judgment and thought content normal.   Nursing note and vitals reviewed.      Assessment:     1. Persistent atrial fibrillation (HCC)     2. Chronic anticoagulation     3. Dyslipidemia         Medical Decision Making:  Today's Assessment / Status / Plan:   1.  Persistent atrial fibrillation  -Continue metoprolol  mg daily and rivaroxaban 20 mg p.m. meal  - Asymptomatic  -Echocardiogram prior to visit in 6 months    2.  Chronic anticoagulation  -Continue rivaroxaban 20 mg with p.m. meal  -Kidney function within normal limits    3.  Dyslipidemia  -Continue atorvastatin 20 mg every evening  -Patient already has order for lipid panel for the near future, awaiting results    Collaborating MD is Dr. PAUL.  Follow-up visit in 6 months with KIERSTEN.    Please note that this dictation was created using voice recognition software.  I have made every reasonable attempt to correct obvious " errors, but it is possible there are errors of grammar or possibly content that I did not discover before finalizing the note.

## 2019-04-03 NOTE — TELEPHONE ENCOUNTER
Rx cost   Received: Today   Message Contents   Sleena POPPY Fam, Med Ass't  Bertha Richard, Med Ass't; Gay Alaniz R.N.   Phone Number: [phone]             DB pt states [facility] is charging him $127 total for his metoprolol & xarelto & states he used to pay only $14.   Ph# [phone]      Contacted patient.  Discussed prescriptions.  Explained a 90-day supply was submitted and offered to re-submit for 30 days.  Patient didn't realize it was 90 day supply and would prefer to leave it as is for now because this will save him money in the long run.  Advised patient to call back for any assistance needed.  Patient also scheduled his echo.

## 2019-04-03 NOTE — TELEPHONE ENCOUNTER
Message   Received: Today   Message Contents   TOMASA Saldivar R.N.             Please inform patient I would like him to obtain echocardiogram prior to his visit with Dr. Vides in 6 months.     Thank you, michael      Attempted to contact patient, no answer.  Left detailed message including imaging scheduling # [phone] so patient can schedule echo sometime before Oct FV with DS

## 2019-06-03 DIAGNOSIS — E78.5 DYSLIPIDEMIA: ICD-10-CM

## 2019-06-03 RX ORDER — ATORVASTATIN CALCIUM 20 MG/1
TABLET, FILM COATED ORAL
Qty: 100 TAB | Refills: 3 | Status: SHIPPED | OUTPATIENT
Start: 2019-06-03 | End: 2020-03-05 | Stop reason: SDUPTHER

## 2019-07-10 DIAGNOSIS — I48.0 PAF (PAROXYSMAL ATRIAL FIBRILLATION) (HCC): ICD-10-CM

## 2019-07-10 RX ORDER — METOPROLOL SUCCINATE 100 MG/1
100 TABLET, EXTENDED RELEASE ORAL DAILY
Qty: 90 TAB | Refills: 3 | Status: SHIPPED | OUTPATIENT
Start: 2019-07-10 | End: 2019-10-08 | Stop reason: SDUPTHER

## 2019-07-25 ENCOUNTER — APPOINTMENT (OUTPATIENT)
Dept: PULMONOLOGY | Facility: HOSPICE | Age: 59
End: 2019-07-25
Payer: COMMERCIAL

## 2019-07-25 ENCOUNTER — APPOINTMENT (OUTPATIENT)
Dept: PULMONOLOGY | Facility: HOSPICE | Age: 59
End: 2019-07-25
Attending: NURSE PRACTITIONER
Payer: COMMERCIAL

## 2019-08-20 ENCOUNTER — OFFICE VISIT (OUTPATIENT)
Dept: PULMONOLOGY | Facility: HOSPICE | Age: 59
End: 2019-08-20
Payer: COMMERCIAL

## 2019-08-20 ENCOUNTER — NON-PROVIDER VISIT (OUTPATIENT)
Dept: PULMONOLOGY | Facility: HOSPICE | Age: 59
End: 2019-08-20
Attending: NURSE PRACTITIONER
Payer: COMMERCIAL

## 2019-08-20 VITALS — WEIGHT: 253 LBS | BODY MASS INDEX: 37.36 KG/M2

## 2019-08-20 VITALS
BODY MASS INDEX: 38.34 KG/M2 | HEIGHT: 68 IN | HEART RATE: 75 BPM | SYSTOLIC BLOOD PRESSURE: 128 MMHG | DIASTOLIC BLOOD PRESSURE: 76 MMHG | RESPIRATION RATE: 16 BRPM | WEIGHT: 253 LBS | OXYGEN SATURATION: 95 %

## 2019-08-20 DIAGNOSIS — J44.9 CHRONIC OBSTRUCTIVE PULMONARY DISEASE, UNSPECIFIED COPD TYPE (HCC): ICD-10-CM

## 2019-08-20 DIAGNOSIS — G47.33 OSA (OBSTRUCTIVE SLEEP APNEA): Chronic | ICD-10-CM

## 2019-08-20 DIAGNOSIS — J45.20 MILD INTERMITTENT ASTHMA WITHOUT COMPLICATION: ICD-10-CM

## 2019-08-20 PROCEDURE — 99214 OFFICE O/P EST MOD 30 MIN: CPT | Mod: 25 | Performed by: INTERNAL MEDICINE

## 2019-08-20 PROCEDURE — 90670 PCV13 VACCINE IM: CPT | Performed by: INTERNAL MEDICINE

## 2019-08-20 PROCEDURE — 90471 IMMUNIZATION ADMIN: CPT | Performed by: INTERNAL MEDICINE

## 2019-08-20 ASSESSMENT — PULMONARY FUNCTION TESTS
FEV1: 1.91
FVC_PREDICTED: 4.36
FEV1_PREDICTED: 3.3
FEV1/FVC: 61
FVC_PERCENT_PREDICTED: 70
FEV1_PERCENT_CHANGE: -2
FEV1/FVC_PERCENT_CHANGE: 1
FEV1_PERCENT_PREDICTED: 58
FEV1/FVC_PERCENT_CHANGE: 0
FEV1/FVC: 62
FVC_PERCENT_PREDICTED: 72
FEV1: 1.92
FEV1/FVC_PERCENT_LLN: 63
FVC_LLN: 3.64
FEV1_PERCENT_CHANGE: 0
FEV1/FVC: 61
FEV1/FVC: 61.81
FEV1/FVC_PERCENT_PREDICTED: 81
FEV1_LLN: 2.76
FEV1/FVC_PERCENT_PREDICTED: 81
FEV1/FVC_PERCENT_PREDICTED: 76
FEV1/FVC_PERCENT_PREDICTED: 79
FEV1/FVC_PERCENT_PREDICTED: 81
FEV1_PERCENT_PREDICTED: 57
FVC: 3.09
FVC: 3.16
FEV1/FVC_PREDICTED: 76

## 2019-08-20 NOTE — PROGRESS NOTES
"CC:  Chief Complaint   Patient presents with   • Follow-Up     Last Seen 1/24/19   • Results     PFT      Follow-up appointment, history of COPD.    HPI:   The patient is a 58 y.o. male with history of COPD came today for follow-up.  Patient had pulmonary function test done prior to today's visit which continued to show moderate obstructive lung disease.  No significant change from previous PFTs.    The patient had run of shortness of breath and cough in July this was eventually subsided without treatment.    Otherwise the patient has been doing well he continue to work 2 jobs he walks his dogs twice a day with no significant breathing difficulties.      ROS:   Constitutional: Denies fevers, chills, night sweats  Eyes: Denies vision loss, pain, drainage, double vision  Ears, Nose, Throat: Denies earache, difficulty hearing, tinnitus, nasal congestion, hoarseness  Cardiovascular: Denies chest pain, tightness, palpitations, orthopnea or edema  Respiratory: Please see HPI  GI: Denies heartburn, dysphagia, nausea, abdominal pain, diarrhea or constipation  : Denies frequent urination, hematuria, discharge or painful urination  Musculoskeletal: Denies back pain, painful joints, sore muscles  Neurological: Denies weakness or headaches  Skin: No rashes  All other ROS were negative except what mentioned in the HPI     Past Medical History:  Past Medical History:   Diagnosis Date   • AF (atrial fibrillation) ([facility]) 6/24/2010   • Alcohol abuse    • Arthritis     \"rhematoid eyes\"   • Atrial fibrillation ([facility]) July 11, 2014    AF with RVR   • Bronchitis 6/25/14    ?   • Chickenpox    • Chronic anticoagulation    • Cold 6/25/14   • Eye pain    • Hearing difficulty    • Hypertension    • Infectious disease    • Obesity    • JACE (obstructive sleep apnea) 6/24/2010    uses CPAP   • Other emphysema ([facility])    • PAF (paroxysmal atrial fibrillation) ([facility]) 10/1/2014   • Pneumonia 6/25/14    ?   • Sleep apnea    • Snoring    • Tonsillitis "    • Wheezing                Social History:  Social History     Socioeconomic History   • Marital status:      Spouse name: Not on file   • Number of children: Not on file   • Years of education: Not on file   • Highest education level: Not on file   Occupational History   • Not on file   Social Needs   • Financial resource strain: Not on file   • Food insecurity:     Worry: Not on file     Inability: Not on file   • Transportation needs:     Medical: Not on file     Non-medical: Not on file   Tobacco Use   • Smoking status: Former Smoker     Packs/day: 0.50     Years: 20.00     Pack years: 10.00     Types: Cigarettes     Last attempt to quit: 1998     Years since quittin.6   • Smokeless tobacco: Never Used   Substance and Sexual Activity   • Alcohol use: Yes     Alcohol/week: 5.4 oz     Types: 6 Glasses of wine, 3 Shots of liquor per week     Comment: around 6 glasses of wine every day and 2-3 shots of tequilla   • Drug use: Yes     Types: Marijuana     Comment: Marijuana every 2 weeks   • Sexual activity: Not on file   Lifestyle   • Physical activity:     Days per week: Not on file     Minutes per session: Not on file   • Stress: Not on file   Relationships   • Social connections:     Talks on phone: Not on file     Gets together: Not on file     Attends Orthodoxy service: Not on file     Active member of club or organization: Not on file     Attends meetings of clubs or organizations: Not on file     Relationship status: Not on file   • Intimate partner violence:     Fear of current or ex partner: Not on file     Emotionally abused: Not on file     Physically abused: Not on file     Forced sexual activity: Not on file   Other Topics Concern   • Not on file   Social History Narrative   • Not on file             Family History:  Family History   Problem Relation Age of Onset   • Hypertension Mother        Current Outpatient Medications on File Prior to Visit   Medication Sig Dispense Refill   •  "metoprolol SR (TOPROL XL) 100 MG TABLET SR 24 HR Take 1 Tab by mouth every day. 90 Tab 3   • atorvastatin (LIPITOR) 20 MG Tab TAKE 1 TABLET BY MOUTH ONCE DAILY 100 Tab 3   • albuterol 108 (90 Base) MCG/ACT Aero Soln inhalation aerosol INHALE TWO PUFFS BY MOUTH EVERY 4 HOURS AS NEEDED FOR SHORTNESS OF BREATH (WHEEZING) 1 Inhaler 5   • DULERA 100-5 MCG/ACT Aerosol INHALE TWO PUFFS BY MOUTH TWICE DAILY .  USE  WITH  SPACER.  RINSE  MOUTH  AFTER  USE 3 Inhaler 3   • fluticasone (FLONASE) 50 MCG/ACT nasal spray 1-2 sprays/nostril qhs 1 Bottle 5   • rivaroxaban (XARELTO) 20 MG Tab tablet Take 1 Tab by mouth with dinner. Needs to be seen for further refills. Thank you (Patient not taking: Reported on 8/20/2019) 90 Tab 0   • Mometasone Furo-Formoterol Fum (DULERA) 200-5 MCG/ACT Aerosol Inhale 2 Puffs by mouth 2 Times a Day. (Patient not taking: Reported on 4/3/2019) 3 Inhaler 3     No current facility-administered medications on file prior to visit.        Allergies:   Patient has no known allergies.        Vitals:    08/20/19 0836   Height: 1.715 m (5' 7.5\")   Weight: 114.8 kg (253 lb)   Weight % change since last entry.: 0 %   BP: 128/76   Pulse: 75   BMI (Calculated): 39.04   Resp: 16           Physical Exam:  Appearance:  in no acute distress  HEENT: Normocephalic, atraumatic, white sclera, PERRLA, oropharynx clear  Neck: No adenopathy or masses  Respiratory: no intercostal retractions or accessory muscle use  Lungs auscultation: Clear to auscultation bilaterally  Cardiovascular: Regular rate rhythm. No murmurs, rubs or gallops.  No LE edema  Abdomen: soft, nondistended  Gait: Normal  Digits: No clubbing, cyanosis  Motor: No focal deficits  Orientation: Oriented to time, person and place      DATA:    Labs:  Lab Results   Component Value Date/Time    WBC 7.2 08/06/2014 08:58 AM    RBC 4.91 08/06/2014 08:58 AM    HEMOGLOBIN 16.1 08/06/2014 08:58 AM    HEMATOCRIT 47.0 08/06/2014 08:58 AM    MCV 95.7 08/06/2014 08:58 AM    " MCH 32.8 08/06/2014 08:58 AM    MCHC 34.3 08/06/2014 08:58 AM    MPV 7.5 08/06/2014 08:58 AM    NEUTSPOLYS 58.3 07/09/2014 05:47 PM    LYMPHOCYTES 23.3 07/09/2014 05:47 PM    MONOCYTES 10.4 (H) 07/09/2014 05:47 PM    EOSINOPHILS 7.3 (H) 07/09/2014 05:47 PM    BASOPHILS 0.7 07/09/2014 05:47 PM      Lab Results   Component Value Date/Time    SODIUM 137 01/14/2019 07:57 AM    POTASSIUM 4.4 01/14/2019 07:57 AM    CHLORIDE 103 01/14/2019 07:57 AM    CO2 26 01/14/2019 07:57 AM    GLUCOSE 86 01/14/2019 07:57 AM    BUN 20 01/14/2019 07:57 AM    CREATININE 0.82 01/14/2019 07:57 AM    CREATININE 1.1 11/30/2007 11:32 AM    BUNCREATRAT 26 (H) 03/17/2018 09:45 AM                  Diagnosis:  1. Chronic obstructive pulmonary disease, unspecified COPD type (HCC)  pneumococcal 13-Adamaris Conj Vacc (PREVNAR 13) syringe 0.5 mL    Pneumococcal Conjugate Vaccine 13-Valent   2. BMI 39.0-39.9,adult  Height And Weight   3. JACE (obstructive sleep apnea)  Height And Weight         Patient COPD is stable.  He quit smoking long time ago.  Patient will continue Dulera and albuterol.  Prevnar 13 vaccine today    Patient will continue to follow-up with the sleep clinic for his JACE    Counseling on active lifestyle and weight loss and healthy diet was provided today    Follow-up in 6 months                    Return in about 6 months (around 2/20/2020).        This note was created using voice recognition software. I apologize for any overlooked obvious grammar or  vocabulary mistake

## 2019-08-27 PROCEDURE — 94729 DIFFUSING CAPACITY: CPT | Performed by: INTERNAL MEDICINE

## 2019-08-27 PROCEDURE — 94060 EVALUATION OF WHEEZING: CPT | Performed by: INTERNAL MEDICINE

## 2019-08-27 PROCEDURE — 94726 PLETHYSMOGRAPHY LUNG VOLUMES: CPT | Performed by: INTERNAL MEDICINE

## 2019-08-31 ENCOUNTER — HOSPITAL ENCOUNTER (OUTPATIENT)
Dept: LAB | Facility: MEDICAL CENTER | Age: 59
End: 2019-08-31
Attending: INTERNAL MEDICINE
Payer: COMMERCIAL

## 2019-08-31 DIAGNOSIS — E78.5 DYSLIPIDEMIA: ICD-10-CM

## 2019-08-31 LAB
ALBUMIN SERPL BCP-MCNC: 4.3 G/DL (ref 3.2–4.9)
ALBUMIN/GLOB SERPL: 1.4 G/DL
ALP SERPL-CCNC: 81 U/L (ref 30–99)
ALT SERPL-CCNC: 43 U/L (ref 2–50)
ANION GAP SERPL CALC-SCNC: 6 MMOL/L (ref 0–11.9)
AST SERPL-CCNC: 31 U/L (ref 12–45)
BILIRUB SERPL-MCNC: 0.8 MG/DL (ref 0.1–1.5)
BUN SERPL-MCNC: 17 MG/DL (ref 8–22)
CALCIUM SERPL-MCNC: 9.3 MG/DL (ref 8.5–10.5)
CHLORIDE SERPL-SCNC: 106 MMOL/L (ref 96–112)
CHOLEST SERPL-MCNC: 143 MG/DL (ref 100–199)
CO2 SERPL-SCNC: 26 MMOL/L (ref 20–33)
CREAT SERPL-MCNC: 0.79 MG/DL (ref 0.5–1.4)
FASTING STATUS PATIENT QL REPORTED: NORMAL
GLOBULIN SER CALC-MCNC: 3 G/DL (ref 1.9–3.5)
GLUCOSE SERPL-MCNC: 100 MG/DL (ref 65–99)
HDLC SERPL-MCNC: 61 MG/DL
LDLC SERPL CALC-MCNC: 72 MG/DL
POTASSIUM SERPL-SCNC: 4.1 MMOL/L (ref 3.6–5.5)
PROT SERPL-MCNC: 7.3 G/DL (ref 6–8.2)
SODIUM SERPL-SCNC: 138 MMOL/L (ref 135–145)
TRIGL SERPL-MCNC: 51 MG/DL (ref 0–149)

## 2019-08-31 PROCEDURE — 80061 LIPID PANEL: CPT

## 2019-08-31 PROCEDURE — 80053 COMPREHEN METABOLIC PANEL: CPT

## 2019-08-31 PROCEDURE — 36415 COLL VENOUS BLD VENIPUNCTURE: CPT

## 2019-09-02 ENCOUNTER — APPOINTMENT (OUTPATIENT)
Dept: CARDIOLOGY | Facility: MEDICAL CENTER | Age: 59
End: 2019-09-02
Attending: NURSE PRACTITIONER
Payer: COMMERCIAL

## 2019-09-05 ENCOUNTER — OFFICE VISIT (OUTPATIENT)
Dept: MEDICAL GROUP | Facility: MEDICAL CENTER | Age: 59
End: 2019-09-05
Payer: COMMERCIAL

## 2019-09-05 VITALS
HEIGHT: 68 IN | SYSTOLIC BLOOD PRESSURE: 124 MMHG | OXYGEN SATURATION: 96 % | TEMPERATURE: 97.2 F | BODY MASS INDEX: 37.59 KG/M2 | WEIGHT: 248 LBS | DIASTOLIC BLOOD PRESSURE: 82 MMHG | HEART RATE: 74 BPM

## 2019-09-05 DIAGNOSIS — R73.02 IGT (IMPAIRED GLUCOSE TOLERANCE): ICD-10-CM

## 2019-09-05 DIAGNOSIS — J44.9 CHRONIC OBSTRUCTIVE PULMONARY DISEASE, UNSPECIFIED COPD TYPE (HCC): ICD-10-CM

## 2019-09-05 DIAGNOSIS — E78.5 DYSLIPIDEMIA: ICD-10-CM

## 2019-09-05 LAB
HBA1C MFR BLD: 5.3 % (ref 0–5.6)
INT CON NEG: NEGATIVE
INT CON POS: POSITIVE

## 2019-09-05 PROCEDURE — 99214 OFFICE O/P EST MOD 30 MIN: CPT | Performed by: INTERNAL MEDICINE

## 2019-09-05 PROCEDURE — 83036 HEMOGLOBIN GLYCOSYLATED A1C: CPT | Performed by: INTERNAL MEDICINE

## 2019-09-05 RX ORDER — METHYLPREDNISOLONE 4 MG/1
TABLET ORAL
Qty: 21 TAB | Refills: 0 | Status: SHIPPED | OUTPATIENT
Start: 2019-09-05 | End: 2019-10-08

## 2019-09-05 ASSESSMENT — PATIENT HEALTH QUESTIONNAIRE - PHQ9: CLINICAL INTERPRETATION OF PHQ2 SCORE: 0

## 2019-09-05 NOTE — PROGRESS NOTES
CC: Follow-up COPD, blood sugars, dyslipidemia.                                                                                                                                      HPI:   Jude presents today with the following.    1. Chronic obstructive pulmonary disease, unspecified COPD type (HCC)  Resents history of COPD referred to breathing but nothing severe.  Reports compliance with inhalers.  No cough or increasing shortness of breath    2. IGT (impaired glucose tolerance)  Blood sugar showed a value of 100 on lab draw A1c checked in office today 5.3.  Weight persistently elevated although down somewhat he does continue to drink heavily.    3. Dyslipidemia  History of dyslipidemia maintain on statin denying any myalgias good control on blood work      Patient Active Problem List    Diagnosis Date Noted   • Alcohol abuse      Priority: High   • JACE (obstructive sleep apnea) 06/24/2010     Priority: High   • Chronic anticoagulation      Priority: Medium   • Dyslipidemia 02/11/2019   • Chronic obstructive pulmonary disease (HCC) 08/09/2018   • Benign prostatic hyperplasia with weak urinary stream 08/09/2018   • Persistent atrial fibrillation (HCC) 05/19/2017   • IGT (impaired glucose tolerance) 11/10/2016   • Iritis 03/17/2011       Current Outpatient Medications   Medication Sig Dispense Refill   • methylPREDNISolone (MEDROL DOSEPAK) 4 MG Tablet Therapy Pack Per package insert. 21 Tab 0   • atorvastatin (LIPITOR) 20 MG Tab TAKE 1 TABLET BY MOUTH ONCE DAILY 100 Tab 3   • rivaroxaban (XARELTO) 20 MG Tab tablet Take 1 Tab by mouth with dinner. Needs to be seen for further refills. Thank you 90 Tab 0   • albuterol 108 (90 Base) MCG/ACT Aero Soln inhalation aerosol INHALE TWO PUFFS BY MOUTH EVERY 4 HOURS AS NEEDED FOR SHORTNESS OF BREATH (WHEEZING) 1 Inhaler 5   • DULERA 100-5 MCG/ACT Aerosol INHALE TWO PUFFS BY MOUTH TWICE DAILY .  USE  WITH  SPACER.  RINSE  MOUTH  AFTER  USE 3 Inhaler 3   • fluticasone  "(FLONASE) 50 MCG/ACT nasal spray 1-2 sprays/nostril qhs 1 Bottle 5   • metoprolol SR (TOPROL XL) 100 MG TABLET SR 24 HR Take 1 Tab by mouth every day. (Patient not taking: Reported on 9/5/2019) 90 Tab 3     No current facility-administered medications for this visit.          Allergies as of 09/05/2019   • (No Known Allergies)        ROS: Denies Chest pain, SOB, LE edema.    /82 (BP Location: Left arm, Patient Position: Sitting)   Pulse 74   Temp 36.2 °C (97.2 °F)   Ht 1.715 m (5' 7.5\")   Wt 112.5 kg (248 lb)   SpO2 96%   BMI 38.27 kg/m²     Physical Exam:  Gen:         Alert and oriented, No apparent distress.  Neck:        No Lymphadenopathy or Bruits.  Lungs:     Clear to auscultation bilaterally  CV:          Regular rate and rhythm. No murmurs, rubs or gallops.               Ext:          No clubbing, cyanosis, edema.      Assessment and Plan.   58 y.o. male with the following issues.    1. Chronic obstructive pulmonary disease, unspecified COPD type (HCC)  Stable have given a Medrol Dosepak to have on hand in case of severe worsening of breathing.  - methylPREDNISolone (MEDROL DOSEPAK) 4 MG Tablet Therapy Pack; Per package insert.  Dispense: 21 Tab; Refill: 0    2. IGT (impaired glucose tolerance)  Discussion about diet and exercise A1c normal.  - POCT Hemoglobin A1C    3. Dyslipidemia  Lipids currently well controlled. Discussed continued diet and exercise recheck 6 months to 1 year.      "

## 2019-09-19 ENCOUNTER — APPOINTMENT (OUTPATIENT)
Dept: CARDIOLOGY | Facility: MEDICAL CENTER | Age: 59
End: 2019-09-19
Attending: NURSE PRACTITIONER
Payer: COMMERCIAL

## 2019-10-03 ENCOUNTER — HOSPITAL ENCOUNTER (OUTPATIENT)
Dept: CARDIOLOGY | Facility: MEDICAL CENTER | Age: 59
End: 2019-10-03
Attending: NURSE PRACTITIONER
Payer: COMMERCIAL

## 2019-10-03 DIAGNOSIS — I48.19 PERSISTENT ATRIAL FIBRILLATION (HCC): ICD-10-CM

## 2019-10-03 LAB
LV EJECT FRACT  99904: 60
LV EJECT FRACT MOD 2C 99903: 55.96
LV EJECT FRACT MOD 4C 99902: 61.27
LV EJECT FRACT MOD BP 99901: 60.83

## 2019-10-03 PROCEDURE — 93306 TTE W/DOPPLER COMPLETE: CPT

## 2019-10-03 PROCEDURE — 93306 TTE W/DOPPLER COMPLETE: CPT | Mod: 26 | Performed by: INTERNAL MEDICINE

## 2019-10-08 ENCOUNTER — OFFICE VISIT (OUTPATIENT)
Dept: CARDIOLOGY | Facility: MEDICAL CENTER | Age: 59
End: 2019-10-08
Payer: COMMERCIAL

## 2019-10-08 VITALS
WEIGHT: 246 LBS | HEIGHT: 68 IN | OXYGEN SATURATION: 96 % | DIASTOLIC BLOOD PRESSURE: 80 MMHG | BODY MASS INDEX: 37.28 KG/M2 | HEART RATE: 64 BPM | SYSTOLIC BLOOD PRESSURE: 122 MMHG

## 2019-10-08 DIAGNOSIS — Z79.01 CHRONIC ANTICOAGULATION: ICD-10-CM

## 2019-10-08 DIAGNOSIS — I48.19 PERSISTENT ATRIAL FIBRILLATION (HCC): ICD-10-CM

## 2019-10-08 DIAGNOSIS — E78.5 DYSLIPIDEMIA: ICD-10-CM

## 2019-10-08 DIAGNOSIS — G47.33 OSA (OBSTRUCTIVE SLEEP APNEA): Chronic | ICD-10-CM

## 2019-10-08 DIAGNOSIS — I48.0 PAF (PAROXYSMAL ATRIAL FIBRILLATION) (HCC): ICD-10-CM

## 2019-10-08 DIAGNOSIS — F10.10 ALCOHOL ABUSE: ICD-10-CM

## 2019-10-08 LAB — EKG IMPRESSION: NORMAL

## 2019-10-08 PROCEDURE — 99214 OFFICE O/P EST MOD 30 MIN: CPT | Performed by: INTERNAL MEDICINE

## 2019-10-08 PROCEDURE — 93000 ELECTROCARDIOGRAM COMPLETE: CPT | Performed by: INTERNAL MEDICINE

## 2019-10-08 RX ORDER — METOPROLOL SUCCINATE 100 MG/1
100 TABLET, EXTENDED RELEASE ORAL DAILY
Qty: 90 TAB | Refills: 3 | Status: SHIPPED | OUTPATIENT
Start: 2019-10-08 | End: 2020-09-04 | Stop reason: SDUPTHER

## 2019-10-08 RX ORDER — MOMETASONE FUROATE AND FORMOTEROL FUMARATE DIHYDRATE 200; 5 UG/1; UG/1
AEROSOL RESPIRATORY (INHALATION)
COMMUNITY
Start: 2019-10-05 | End: 2019-10-08

## 2019-10-08 NOTE — PROGRESS NOTES
"Chief Complaint   Patient presents with   • Atrial Fibrillation     F/V DX:PERSISTENT AFIB       Subjective:   Jude Jiménez is a 59 y.o. male who presents today with asymptomatic persistent atrial fibrillation.  Recent COPD exacerbation treated with steroids improved.  Recent echocardiogram preserved LV function unremarkable.  On a NOAC has not been taking his beta-blockers.  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  Denies any chest pain or shortness of breath at this time denies any palpitations    Past Medical History:   Diagnosis Date   • AF (atrial fibrillation) ([facility]) 6/24/2010   • Alcohol abuse    • Arthritis     \"rhematoid eyes\"   • Atrial fibrillation ([facility]) July 11, 2014    AF with RVR   • Bronchitis 6/25/14    ?   • Chickenpox    • Chronic anticoagulation    • Cold 6/25/14   • Eye pain    • Hearing difficulty    • Hypertension    • Infectious disease    • Obesity    • JACE (obstructive sleep apnea) 6/24/2010    uses CPAP   • Other emphysema ([facility])    • PAF (paroxysmal atrial fibrillation) ([facility]) 10/1/2014   • Pneumonia 6/25/14    ?   • Sleep apnea    • Snoring    • Tonsillitis    • Wheezing      Past Surgical History:   Procedure Laterality Date   • RECOVERY  8/7/2014    Performed by Cath-Recovery Surgery at SURGERY SAME DAY [facility] ORS     Family History   Problem Relation Age of Onset   • Hypertension Mother      Social History     Socioeconomic History   • Marital status:      Spouse name: Not on file   • Number of children: Not on file   • Years of education: Not on file   • Highest education level: Not on file   Occupational History   • Not on file   Social Needs   • Financial resource strain: Not on file   • Food insecurity:     Worry: Not on file     Inability: Not on file   • Transportation needs:     Medical: Not on file     Non-medical: Not on file   Tobacco Use   • Smoking status: Former Smoker     Packs/day: 0.50     Years: 20.00     Pack years: 10.00     Types: " Cigarettes     Last attempt to quit: 1998     Years since quittin.7   • Smokeless tobacco: Never Used   Substance and Sexual Activity   • Alcohol use: Yes     Alcohol/week: 5.4 oz     Types: 6 Glasses of wine, 3 Shots of liquor per week     Comment: around 6 glasses of wine every day and 2-3 shots of tequilla   • Drug use: Yes     Types: Marijuana     Comment: Marijuana every 2 weeks   • Sexual activity: Not on file   Lifestyle   • Physical activity:     Days per week: Not on file     Minutes per session: Not on file   • Stress: Not on file   Relationships   • Social connections:     Talks on phone: Not on file     Gets together: Not on file     Attends Amish service: Not on file     Active member of club or organization: Not on file     Attends meetings of clubs or organizations: Not on file     Relationship status: Not on file   • Intimate partner violence:     Fear of current or ex partner: Not on file     Emotionally abused: Not on file     Physically abused: Not on file     Forced sexual activity: Not on file   Other Topics Concern   • Not on file   Social History Narrative   • Not on file     No Known Allergies  Outpatient Encounter Medications as of 10/8/2019   Medication Sig Dispense Refill   • rivaroxaban (XARELTO) 20 MG Tab tablet Take 1 Tab by mouth with dinner. Needs to be seen for further refills. Thank you 90 Tab 3   • metoprolol SR (TOPROL XL) 100 MG TABLET SR 24 HR Take 1 Tab by mouth every day. 90 Tab 3   • atorvastatin (LIPITOR) 20 MG Tab TAKE 1 TABLET BY MOUTH ONCE DAILY 100 Tab 3   • albuterol 108 (90 Base) MCG/ACT Aero Soln inhalation aerosol INHALE TWO PUFFS BY MOUTH EVERY 4 HOURS AS NEEDED FOR SHORTNESS OF BREATH (WHEEZING) 1 Inhaler 5   • DULERA 100-5 MCG/ACT Aerosol INHALE TWO PUFFS BY MOUTH TWICE DAILY .  USE  WITH  SPACER.  RINSE  MOUTH  AFTER  USE 3 Inhaler 3   • fluticasone (FLONASE) 50 MCG/ACT nasal spray 1-2 sprays/nostril qhs 1 Bottle 5   • [DISCONTINUED] DULERA 200-5  "MCG/ACT Aerosol      • [DISCONTINUED] methylPREDNISolone (MEDROL DOSEPAK) 4 MG Tablet Therapy Pack Per package insert. (Patient not taking: Reported on 10/8/2019) 21 Tab 0   • [DISCONTINUED] metoprolol SR (TOPROL XL) 100 MG TABLET SR 24 HR Take 1 Tab by mouth every day. (Patient not taking: Reported on 9/5/2019) 90 Tab 3   • [DISCONTINUED] rivaroxaban (XARELTO) 20 MG Tab tablet Take 1 Tab by mouth with dinner. Needs to be seen for further refills. Thank you 90 Tab 0     No facility-administered encounter medications on file as of 10/8/2019.      ROS     Objective:   /80 (BP Location: Left arm, Patient Position: Sitting, BP Cuff Size: Adult)   Pulse 64   Ht 1.715 m (5' 7.5\")   Wt 111.6 kg (246 lb)   SpO2 96%   BMI 37.96 kg/m²     Physical Exam   Constitutional: He is oriented to person, place, and time. He appears well-developed and well-nourished.   HENT:   Head: Normocephalic and atraumatic.   Eyes: EOM are normal.   Neck: Normal range of motion. Neck supple.   Cardiovascular: Normal rate and intact distal pulses. An irregularly irregular rhythm present. Exam reveals no gallop and no friction rub.   No murmur heard.  Pulmonary/Chest: Effort normal and breath sounds normal.   Abdominal: Soft.   Musculoskeletal: Normal range of motion. He exhibits no edema.   Neurological: He is alert and oriented to person, place, and time.   Skin: Skin is warm and dry.   Psychiatric: He has a normal mood and affect. His behavior is normal. Judgment and thought content normal.       Assessment:     1. PAF (paroxysmal atrial fibrillation) ([facility])  EKG    rivaroxaban (XARELTO) 20 MG Tab tablet    metoprolol SR (TOPROL XL) 100 MG TABLET SR 24 HR   2. Chronic anticoagulation  rivaroxaban (XARELTO) 20 MG Tab tablet   3. Persistent atrial fibrillation     4. JACE (obstructive sleep apnea)     5. Dyslipidemia     6. Alcohol abuse         Medical Decision Making:  Today's Assessment / Status / Plan:   1.  Persistent atrial " fibrillation continue rate control restart Toprol.  2.  Anticoagulation continue Xarelto.  3.  Hyperlipidemia on a statin with good labs.  4.  Sleep apnea on CPAP.  5.  Moderate alcohol use working on cutting back currently on 3 drinks per day.  6.  Follow-up with me in 1 year.

## 2019-10-23 DIAGNOSIS — J44.9 CHRONIC OBSTRUCTIVE PULMONARY DISEASE, UNSPECIFIED COPD TYPE (HCC): ICD-10-CM

## 2019-10-23 RX ORDER — METHYLPREDNISOLONE 4 MG/1
TABLET ORAL
Qty: 21 TAB | Refills: 0 | Status: SHIPPED | OUTPATIENT
Start: 2019-10-23 | End: 2019-10-31 | Stop reason: SDUPTHER

## 2019-10-28 DIAGNOSIS — J44.9 CHRONIC OBSTRUCTIVE PULMONARY DISEASE, UNSPECIFIED COPD TYPE (HCC): ICD-10-CM

## 2019-10-29 ENCOUNTER — OFFICE VISIT (OUTPATIENT)
Dept: URGENT CARE | Facility: CLINIC | Age: 59
End: 2019-10-29
Payer: COMMERCIAL

## 2019-10-29 VITALS
HEART RATE: 81 BPM | WEIGHT: 251 LBS | BODY MASS INDEX: 38.04 KG/M2 | RESPIRATION RATE: 22 BRPM | SYSTOLIC BLOOD PRESSURE: 140 MMHG | TEMPERATURE: 97.7 F | HEIGHT: 68 IN | DIASTOLIC BLOOD PRESSURE: 88 MMHG | OXYGEN SATURATION: 92 %

## 2019-10-29 DIAGNOSIS — J44.9 CHRONIC OBSTRUCTIVE PULMONARY DISEASE, UNSPECIFIED COPD TYPE (HCC): ICD-10-CM

## 2019-10-29 DIAGNOSIS — J40 BRONCHITIS: ICD-10-CM

## 2019-10-29 PROCEDURE — 99214 OFFICE O/P EST MOD 30 MIN: CPT | Performed by: PHYSICIAN ASSISTANT

## 2019-10-29 RX ORDER — PREDNISONE 20 MG/1
TABLET ORAL
Qty: 10 TAB | Refills: 0 | Status: SHIPPED | OUTPATIENT
Start: 2019-10-29 | End: 2019-10-31

## 2019-10-29 RX ORDER — IPRATROPIUM BROMIDE AND ALBUTEROL SULFATE 2.5; .5 MG/3ML; MG/3ML
3 SOLUTION RESPIRATORY (INHALATION) ONCE
Status: COMPLETED | OUTPATIENT
Start: 2019-10-29 | End: 2019-10-29

## 2019-10-29 RX ORDER — DOXYCYCLINE HYCLATE 100 MG
100 TABLET ORAL 2 TIMES DAILY
Qty: 14 TAB | Refills: 0 | Status: SHIPPED | OUTPATIENT
Start: 2019-10-29 | End: 2019-10-31

## 2019-10-29 RX ADMIN — IPRATROPIUM BROMIDE AND ALBUTEROL SULFATE 3 ML: 2.5; .5 SOLUTION RESPIRATORY (INHALATION) at 08:38

## 2019-10-29 ASSESSMENT — ENCOUNTER SYMPTOMS
SORE THROAT: 1
SPUTUM PRODUCTION: 1
RHINORRHEA: 1
HEMOPTYSIS: 0
EYE REDNESS: 0
COUGH: 1
FALLS: 0
ORTHOPNEA: 0
VOMITING: 0
FEVER: 0
SHORTNESS OF BREATH: 1
DIARRHEA: 0
WHEEZING: 1
CHILLS: 0
EYE DISCHARGE: 0
MYALGIAS: 0

## 2019-10-29 ASSESSMENT — COPD QUESTIONNAIRES: COPD: 1

## 2019-10-29 NOTE — PROGRESS NOTES
"Subjective:      Jude Jiménez is a 59 y.o. male who presents with Shortness of Breath (productive cough-x3 days)            Patient is a 59-year-old male who presents to urgent care with cough, congestion and wheezing for the last 3 days.  Patient does have history of COPD of which she is been utilizing Dulera along with albuterol.  He reports today's uses albuterol 3 times since earlier this morning with minimal relief.  He denies fevers, chills or recent ill contacts.  He denies any new leg swelling.  He does have history of sleep apnea of which he has been compliant along with history of A. fib for which she is currently on Xarelto.    Shortness of Breath   This is a new problem. The current episode started in the past 7 days. The problem occurs daily. The problem has been waxing and waning. Associated symptoms include rhinorrhea, a sore throat, sputum production and wheezing. Pertinent negatives include no chest pain, fever, hemoptysis, orthopnea, rash or vomiting. Nothing aggravates the symptoms. He has tried beta agonist inhalers for the symptoms. His past medical history is significant for asthma and COPD.       Review of Systems   Constitutional: Positive for malaise/fatigue. Negative for chills and fever.   HENT: Positive for congestion, rhinorrhea and sore throat.    Eyes: Negative for discharge and redness.   Respiratory: Positive for cough, sputum production, shortness of breath and wheezing. Negative for hemoptysis.    Cardiovascular: Negative for chest pain and orthopnea.   Gastrointestinal: Negative for diarrhea and vomiting.   Musculoskeletal: Negative for falls and myalgias.   Skin: Negative for rash.   All other systems reviewed and are negative.         Objective:     /88 (BP Location: Left arm)   Pulse 81   Temp 36.5 °C (97.7 °F) (Temporal)   Resp (!) 22   Ht 1.727 m (5' 8\")   Wt 113.9 kg (251 lb)   SpO2 92%   BMI 38.16 kg/m²    PMH:  has a past medical history of AF (atrial " fibrillation) ([facility]) (6/24/2010), Alcohol abuse, Arthritis, Atrial fibrillation ([facility]) (July 11, 2014), Bronchitis (6/25/14), Chickenpox, Chronic anticoagulation, Cold (6/25/14), Eye pain, Hearing difficulty, Hypertension, Infectious disease, Obesity, JACE (obstructive sleep apnea) (6/24/2010), Other emphysema ([facility]), PAF (paroxysmal atrial fibrillation) ([facility]) (10/1/2014), Pneumonia (6/25/14), Sleep apnea, Snoring, Tonsillitis, and Wheezing.  MEDS:   Current Outpatient Medications:   •  albuterol 108 (90 Base) MCG/ACT Aero Soln inhalation aerosol, INHALE TWO PUFFS BY MOUTH EVERY 4 HOURS AS NEEDED FOR SHORTNESS OF BREATH (WHEEZING), Disp: 1 Inhaler, Rfl: 5  •  DULERA 100-5 MCG/ACT Aerosol, INHALE TWO PUFFS BY MOUTH TWICE DAILY .  USE  WITH  SPACER.  RINSE  MOUTH  AFTER  USE, Disp: 3 Inhaler, Rfl: 3  •  methylPREDNISolone (MEDROL DOSEPAK) 4 MG Tablet Therapy Pack, TAKE BY MOUTH AS DIRECTED ON INSIDE OF PACKAGE, Disp: 21 Tab, Rfl: 0  •  rivaroxaban (XARELTO) 20 MG Tab tablet, Take 1 Tab by mouth with dinner. Needs to be seen for further refills. Thank you, Disp: 90 Tab, Rfl: 3  •  metoprolol SR (TOPROL XL) 100 MG TABLET SR 24 HR, Take 1 Tab by mouth every day., Disp: 90 Tab, Rfl: 3  •  atorvastatin (LIPITOR) 20 MG Tab, TAKE 1 TABLET BY MOUTH ONCE DAILY, Disp: 100 Tab, Rfl: 3  •  fluticasone (FLONASE) 50 MCG/ACT nasal spray, 1-2 sprays/nostril qhs, Disp: 1 Bottle, Rfl: 5  ALLERGIES: No Known Allergies  SURGHX:   Past Surgical History:   Procedure Laterality Date   • RECOVERY  8/7/2014    Performed by Cath-Recovery Surgery at SURGERY SAME DAY [facility]     SOCHX:  reports that he quit smoking about 21 years ago. His smoking use included cigarettes. He has a 10.00 pack-year smoking history. He has never used smokeless tobacco. He reports that he drinks about 5.4 oz of alcohol per week. He reports that he has current or past drug history. Drug: Marijuana.  FH: Family history was reviewed, no pertinent findings to  report      Physical Exam   Constitutional: He is oriented to person, place, and time. He appears well-developed and well-nourished.   HENT:   Head: Normocephalic and atraumatic.   Mouth/Throat: No oropharyngeal exudate.   Ears- Canals clear- TM- with clear fluid effusions bilaterally.   Pos. PND, with slight erythema- without tonsillar edema or exudate.   Mild discharge noted bilaterally- to nares.      Eyes: Pupils are equal, round, and reactive to light. EOM are normal.   Neck: Normal range of motion. Neck supple.   Cardiovascular: An irregularly irregular rhythm present.   No murmur heard.  Pulmonary/Chest: Effort normal. No respiratory distress. He has wheezes.   Musculoskeletal: Normal range of motion. He exhibits no edema or tenderness.   Lymphadenopathy:     He has no cervical adenopathy.   Neurological: He is alert and oriented to person, place, and time.   Skin: Skin is warm. No rash noted.   Psychiatric: He has a normal mood and affect. His behavior is normal.   Vitals reviewed.              Assessment/Plan:     1. Bronchitis  - ipratropium-albuterol (DUONEB) nebulizer solution  - predniSONE (DELTASONE) 20 MG Tab; Take 2 tabs daily for 5 days.  Dispense: 10 Tab; Refill: 0  - doxycycline (VIBRAMYCIN) 100 MG Tab; Take 1 Tab by mouth 2 times a day for 7 days.  Dispense: 14 Tab; Refill: 0    2. Chronic obstructive pulmonary disease, unspecified COPD type (HCC)  - predniSONE (DELTASONE) 20 MG Tab; Take 2 tabs daily for 5 days.  Dispense: 10 Tab; Refill: 0  - doxycycline (VIBRAMYCIN) 100 MG Tab; Take 1 Tab by mouth 2 times a day for 7 days.  Dispense: 14 Tab; Refill: 0      Patient is to continue with his Dulera along with albuterol if needed at home.  Patient symptoms consistent with bronchitis at this time we will start the patient on prednisone-patient with prior impaired glucose tolerance of which last A1c approximately 6 weeks ago was 5.3.    Recheck after breathing treatment with subjective improvement  per patient.  Improved air movement throughout.  Pulse ox recheck 95% on room air.  Patient given precautionary s/sx that mandate immediate follow up and evaluation in the ED. Advised of risks of not doing so.    DDX, Supportive care, and indications for immediate follow-up discussed with patient.    Instructed to return to clinic or nearest emergency department if we are not available for any change in condition, further concerns, or worsening of symptoms.    The patient demonstrated a good understanding and agreed with the treatment plan.  Please note that this dictation was created using voice recognition software. I have made every reasonable attempt to correct obvious errors, but I expect that there are errors of grammar and possibly content that I did not discover before finalizing the note.

## 2019-10-30 RX ORDER — METHYLPREDNISOLONE 4 MG/1
TABLET ORAL
Qty: 21 TAB | Refills: 0 | Status: SHIPPED | OUTPATIENT
Start: 2019-10-30 | End: 2019-10-31

## 2019-10-31 ENCOUNTER — OFFICE VISIT (OUTPATIENT)
Dept: MEDICAL GROUP | Facility: MEDICAL CENTER | Age: 59
End: 2019-10-31
Payer: COMMERCIAL

## 2019-10-31 VITALS
OXYGEN SATURATION: 98 % | TEMPERATURE: 97 F | BODY MASS INDEX: 37.89 KG/M2 | DIASTOLIC BLOOD PRESSURE: 70 MMHG | SYSTOLIC BLOOD PRESSURE: 114 MMHG | WEIGHT: 250 LBS | HEART RATE: 74 BPM | HEIGHT: 68 IN

## 2019-10-31 DIAGNOSIS — J44.9 CHRONIC OBSTRUCTIVE PULMONARY DISEASE, UNSPECIFIED COPD TYPE (HCC): ICD-10-CM

## 2019-10-31 PROCEDURE — 99213 OFFICE O/P EST LOW 20 MIN: CPT | Performed by: INTERNAL MEDICINE

## 2019-10-31 RX ORDER — METHYLPREDNISOLONE 4 MG/1
TABLET ORAL
Qty: 21 TAB | Refills: 1 | Status: SHIPPED | OUTPATIENT
Start: 2019-10-31 | End: 2020-01-16

## 2019-10-31 NOTE — PROGRESS NOTES
CC: Follow-up COPD                                                                                                                                      HPI:   Jude presents today with the following.    1. Chronic obstructive pulmonary disease, unspecified COPD type (HCC)  Presents reporting that over the weekend he went to urgent care for a cough and difficulty breathing.  He had taken a prednisone Dosepak prior to going.  He was found to be wheezing slightly placed on a longer course of prednisone and given an antibiotic.  He reports his symptoms are improving slightly his breathing is doing better no fevers or chills.  No other acute complaints.      Patient Active Problem List    Diagnosis Date Noted   • Alcohol abuse      Priority: High   • JACE (obstructive sleep apnea) 06/24/2010     Priority: High   • Chronic anticoagulation      Priority: Medium   • Dyslipidemia 02/11/2019   • Chronic obstructive pulmonary disease (HCC) 08/09/2018   • Benign prostatic hyperplasia with weak urinary stream 08/09/2018   • Persistent atrial fibrillation 05/19/2017   • IGT (impaired glucose tolerance) 11/10/2016   • Iritis 03/17/2011       Current Outpatient Medications   Medication Sig Dispense Refill   • methylPREDNISolone (MEDROL DOSEPAK) 4 MG Tablet Therapy Pack TAKE BY MOUTH AS DIRECTED ON INSIDE OF PACKAGE 21 Tab 1   • rivaroxaban (XARELTO) 20 MG Tab tablet Take 1 Tab by mouth with dinner. Needs to be seen for further refills. Thank you 90 Tab 3   • metoprolol SR (TOPROL XL) 100 MG TABLET SR 24 HR Take 1 Tab by mouth every day. 90 Tab 3   • atorvastatin (LIPITOR) 20 MG Tab TAKE 1 TABLET BY MOUTH ONCE DAILY 100 Tab 3   • albuterol 108 (90 Base) MCG/ACT Aero Soln inhalation aerosol INHALE TWO PUFFS BY MOUTH EVERY 4 HOURS AS NEEDED FOR SHORTNESS OF BREATH (WHEEZING) 1 Inhaler 5   • DULERA 100-5 MCG/ACT Aerosol INHALE TWO PUFFS BY MOUTH TWICE DAILY .  USE  WITH  SPACER.  RINSE  MOUTH  AFTER  USE 3 Inhaler 3   •  "fluticasone (FLONASE) 50 MCG/ACT nasal spray 1-2 sprays/nostril qhs 1 Bottle 5     No current facility-administered medications for this visit.          Allergies as of 10/31/2019   • (No Known Allergies)        ROS: Denies Chest pain, SOB, LE edema.    /70 (BP Location: Right arm, Patient Position: Sitting)   Pulse 74   Temp 36.1 °C (97 °F)   Ht 1.727 m (5' 8\")   Wt 113.4 kg (250 lb)   SpO2 98%   BMI 38.01 kg/m²     Physical Exam:  Gen:         Alert and oriented, No apparent distress.  Heent:       TMs clear bilaterally, oropharynx without erythema or exudates  Neck:        No Lymphadenopathy or Bruits.  Lungs:     Clear to auscultation bilaterally  CV:          Regular rate and rhythm. No murmurs, rubs or gallops.               Ext:          No clubbing, cyanosis, edema.      Assessment and Plan.   59 y.o. male with the following issues.    1. Chronic obstructive pulmonary disease, unspecified COPD type (HCC)  Slight exacerbation have given a Medrol Dosepak to have on hand at all times if he should have another exacerbation.  If symptoms should worsen will consider adding Spiriva as to current regimen.  - methylPREDNISolone (MEDROL DOSEPAK) 4 MG Tablet Therapy Pack; TAKE BY MOUTH AS DIRECTED ON INSIDE OF PACKAGE  Dispense: 21 Tab; Refill: 1      "

## 2019-11-26 ENCOUNTER — OFFICE VISIT (OUTPATIENT)
Dept: PULMONOLOGY | Facility: HOSPICE | Age: 59
End: 2019-11-26
Payer: COMMERCIAL

## 2019-11-26 VITALS
BODY MASS INDEX: 37.59 KG/M2 | WEIGHT: 248 LBS | HEART RATE: 64 BPM | HEIGHT: 68 IN | SYSTOLIC BLOOD PRESSURE: 118 MMHG | DIASTOLIC BLOOD PRESSURE: 84 MMHG | OXYGEN SATURATION: 92 %

## 2019-11-26 DIAGNOSIS — I48.19 PERSISTENT ATRIAL FIBRILLATION (HCC): ICD-10-CM

## 2019-11-26 DIAGNOSIS — G47.33 OSA (OBSTRUCTIVE SLEEP APNEA): Chronic | ICD-10-CM

## 2019-11-26 DIAGNOSIS — J44.89 CHRONIC OBSTRUCTIVE ASTHMA: ICD-10-CM

## 2019-11-26 DIAGNOSIS — Z79.01 CHRONIC ANTICOAGULATION: ICD-10-CM

## 2019-11-26 DIAGNOSIS — Z87.891 FORMER SMOKER: ICD-10-CM

## 2019-11-26 PROCEDURE — 99214 OFFICE O/P EST MOD 30 MIN: CPT | Performed by: NURSE PRACTITIONER

## 2019-11-26 NOTE — PROGRESS NOTES
"Chief Complaint   Patient presents with   • Apnea     Auto CPAP @ 10-16 CM H20.        HPI:  Jude Jiménez is a 59 y.o. year old male here today for follow-up on JACE.    ALSO PULMONARY PATIENT; SEE DICTATION 8/20/19 AND 1/24/19. HX OF COPD/ASTHMA.    Hx of SHRAVAN Holman and diagnosed with JACE >10yrs ago.  PSG indicated severe JACE with an RDI of 96.9/h.  He is currently using auto CPAP 10 to 16 cm nightly.  Compliance card 10/27/2019 through 11/25/2019 indicates 100% compliance, average nightly use of 8 hours, mean pressure 10.7 cm, moderate mask leaking of 9 minutes with an overall AHI of 3.7/h.  I reviewed findings with patient. He tolerates mask and pressure well but interested in mask fit. He denies AM headaches. He feels energy is consistent but he will nap on the weekends with his device for 1-2hrs as a luxury. He is still able to resume sleep at bedtime. He notes sleeping to be slightly worse due to health issues with his mother and recent move 2 weeks ago. He attributes this to life stress. He denies significant daytime fatigue. 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  He denies cardiac or respiratory issues today. He continues inhalers and notes his breathing to be much improved since his move. He is no longer coughing/wheezing. He notes quite a bit of mold at his old apartment.    BMi 38 - he has gained 13lbs since the beginning of the year.    ROS: As per HPI and otherwise negative if not stated.    Past Medical History:   Diagnosis Date   • AF (atrial fibrillation) (HCC) 6/24/2010   • Alcohol abuse    • Arthritis     \"rhematoid eyes\"   • Atrial fibrillation (HCC) July 11, 2014    AF with RVR   • Bronchitis 6/25/14    ?   • Chickenpox    • Chronic anticoagulation    • Cold 6/25/14   • Eye pain    • Hearing difficulty    • Hypertension    • Infectious disease    • Obesity    • JACE (obstructive sleep apnea) 6/24/2010    uses CPAP   • Other emphysema (HCC)    • PAF " (paroxysmal atrial fibrillation) ([facility]) 10/1/2014   • Pneumonia 14    ?   • Sleep apnea    • Snoring    • Tonsillitis    • Wheezing        Past Surgical History:   Procedure Laterality Date   • RECOVERY  2014    Performed by Cath-Recovery Surgery at SURGERY SAME DAY [facility] ORS       Family History   Problem Relation Age of Onset   • Hypertension Mother        Social History     Socioeconomic History   • Marital status:      Spouse name: Not on file   • Number of children: Not on file   • Years of education: Not on file   • Highest education level: Not on file   Occupational History   • Not on file   Social Needs   • Financial resource strain: Not on file   • Food insecurity:     Worry: Not on file     Inability: Not on file   • Transportation needs:     Medical: Not on file     Non-medical: Not on file   Tobacco Use   • Smoking status: Former Smoker     Packs/day: 0.50     Years: 20.00     Pack years: 10.00     Types: Cigarettes     Last attempt to quit: 1998     Years since quittin.9   • Smokeless tobacco: Never Used   Substance and Sexual Activity   • Alcohol use: Yes     Alcohol/week: 5.4 oz     Types: 6 Glasses of wine, 3 Shots of liquor per week     Comment: around 6 glasses of wine every day and 2-3 shots of tequilla   • Drug use: Yes     Types: Marijuana     Comment: Marijuana every 2 weeks   • Sexual activity: Not on file   Lifestyle   • Physical activity:     Days per week: Not on file     Minutes per session: Not on file   • Stress: Not on file   Relationships   • Social connections:     Talks on phone: Not on file     Gets together: Not on file     Attends Taoism service: Not on file     Active member of club or organization: Not on file     Attends meetings of clubs or organizations: Not on file     Relationship status: Not on file   • Intimate partner violence:     Fear of current or ex partner: Not on file     Emotionally abused: Not on file     Physically abused: Not on  "file     Forced sexual activity: Not on file   Other Topics Concern   • Not on file   Social History Narrative   • Not on file       Allergies as of 11/26/2019   • (No Known Allergies)        Vitals:  /84 (BP Location: Left arm, Patient Position: Sitting, BP Cuff Size: Adult)   Pulse 64   Ht 1.715 m (5' 7.5\")   Wt 112.5 kg (248 lb)   SpO2 92%     Current medications as of today   Current Outpatient Medications   Medication Sig Dispense Refill   • methylPREDNISolone (MEDROL DOSEPAK) 4 MG Tablet Therapy Pack TAKE BY MOUTH AS DIRECTED ON INSIDE OF PACKAGE 21 Tab 1   • rivaroxaban (XARELTO) 20 MG Tab tablet Take 1 Tab by mouth with dinner. Needs to be seen for further refills. Thank you 90 Tab 3   • metoprolol SR (TOPROL XL) 100 MG TABLET SR 24 HR Take 1 Tab by mouth every day. 90 Tab 3   • atorvastatin (LIPITOR) 20 MG Tab TAKE 1 TABLET BY MOUTH ONCE DAILY 100 Tab 3   • albuterol 108 (90 Base) MCG/ACT Aero Soln inhalation aerosol INHALE TWO PUFFS BY MOUTH EVERY 4 HOURS AS NEEDED FOR SHORTNESS OF BREATH (WHEEZING) 1 Inhaler 5   • DULERA 100-5 MCG/ACT Aerosol INHALE TWO PUFFS BY MOUTH TWICE DAILY .  USE  WITH  SPACER.  RINSE  MOUTH  AFTER  USE 3 Inhaler 3   • fluticasone (FLONASE) 50 MCG/ACT nasal spray 1-2 sprays/nostril qhs 1 Bottle 5     No current facility-administered medications for this visit.          Physical Exam:   Gen:           Alert and oriented, No apparent distress. Mood and affect appropriate, normal interaction with examiner.  Eyes:          PERRL, EOM intact, sclere white, conjunctive moist.  Ears:          Not examined.   Hearing:     Grossly intact.  Nose:          Normal, no lesions or deformities.  Dentition:    Good dentition.  Oropharynx:   Tongue normal, posterior pharynx without erythema or exudate.  Mallampati Classification: 2/3  Neck:        Supple, trachea midline, no masses.  Respiratory Effort: No intercostal retractions or use of accessory muscles.   Lung Auscultation:      " Clear to auscultation bilaterally; no rales, rhonchi or wheezing.  CV:            Regular rate and rhythm. No murmurs, rubs or gallops.  Abd:           Not examined.   Lymphadenopathy: Not examined.  Gait and Station: Normal.  Digits and Nails: No clubbing, cyanosis, petechiae, or nodes.   Cranial Nerves: II-XII grossly intact.  Skin:        No rashes, lesions or ulcers noted.               Ext:           No cyanosis or edema.      Assessment:  1. JACE (obstructive sleep apnea)     2. Chronic obstructive asthma (HCC)     3. Persistent atrial fibrillation     4. Chronic anticoagulation     5. Former smoker     6. BMI 38.0-38.9,adult  Height And Weight       Immunizations:    Flu:10/2019  Pneumovax 23:9/2015  Prevnar 13:8/2019    Plan:  1.  JACE is clinically stable.  Continue auto CPAP 10 to 16 cm nightly.  DME mask/supplies with mask fit patient patient will call that mask fit.  2.  Discussed sleep hygiene.  3.  Encouraged weight loss through diet and exercise.  4.  Follow-up with primary care for other health concerns.  5.  Continue current bronchodilator regimen.  Patient does have antibiotic and steroid on hand for emergencies.  6.  Follow-up at sleep center 1 year with compliance report, sooner if needed.  Follow-up at pulmonary clinic as scheduled February 2020.    Please note that this dictation was created using voice recognition software. I have made every reasonable attempt to correct obvious errors, but it is possible there are errors of grammar and possibly content that I did not discover before finalizing the note.

## 2020-01-16 DIAGNOSIS — J45.30 MILD PERSISTENT ASTHMA WITHOUT COMPLICATION: ICD-10-CM

## 2020-01-16 DIAGNOSIS — J45.20 MILD INTERMITTENT ASTHMA WITHOUT COMPLICATION: ICD-10-CM

## 2020-01-16 DIAGNOSIS — J44.9 CHRONIC OBSTRUCTIVE PULMONARY DISEASE, UNSPECIFIED COPD TYPE (HCC): ICD-10-CM

## 2020-01-16 RX ORDER — ALBUTEROL SULFATE 90 UG/1
AEROSOL, METERED RESPIRATORY (INHALATION)
Qty: 1 INHALER | Refills: 5 | Status: SHIPPED | OUTPATIENT
Start: 2020-01-16 | End: 2020-09-04 | Stop reason: SDUPTHER

## 2020-01-16 RX ORDER — METHYLPREDNISOLONE 4 MG/1
TABLET ORAL
Qty: 21 TAB | Refills: 0 | Status: SHIPPED | OUTPATIENT
Start: 2020-01-16 | End: 2020-03-05 | Stop reason: SDUPTHER

## 2020-01-16 NOTE — TELEPHONE ENCOUNTER
Have we ever prescribed this med? Yes.  If yes, what date? 2/15/19    Last OV: 11/26/19 JEREMÍAS CORDON     Next OV: 2/20/2020 JEREMÍAS CORDON     DX: Mild persistent asthma without complication (J45.30)    Medications: VENTOLIN  (90 Base) MCG/ACT Aero Soln inhalation aerosol

## 2020-01-17 NOTE — TELEPHONE ENCOUNTER
Pt requesting prescription for emergencies. Pt states used prescription ending of Gertrude and being of the year. Declined any symptoms. Informed prescriptions for Ventolin prescribed BY mariangel Guaman sent to pharmacy and Medrol dose pack prescribed by dr. Marquez sent to pharmacy.     Have we ever prescribed this med? Yes.  If yes, what date? 1/24/18    Last OV: 11/26/19 mariangel Guaman     Next OV: 2/20/2020 mariangel Guaman     DX: Mild persistent asthma without complication (J45.30)    Medications: ZPAK

## 2020-01-20 RX ORDER — AZITHROMYCIN 250 MG/1
TABLET, FILM COATED ORAL
Qty: 6 TAB | Refills: 0 | Status: SHIPPED | OUTPATIENT
Start: 2020-01-20 | End: 2020-07-02

## 2020-03-05 ENCOUNTER — OFFICE VISIT (OUTPATIENT)
Dept: MEDICAL GROUP | Facility: MEDICAL CENTER | Age: 60
End: 2020-03-05
Payer: COMMERCIAL

## 2020-03-05 VITALS
WEIGHT: 246 LBS | SYSTOLIC BLOOD PRESSURE: 122 MMHG | HEIGHT: 68 IN | TEMPERATURE: 97.2 F | HEART RATE: 68 BPM | OXYGEN SATURATION: 94 % | BODY MASS INDEX: 37.28 KG/M2 | DIASTOLIC BLOOD PRESSURE: 64 MMHG

## 2020-03-05 DIAGNOSIS — J44.9 CHRONIC OBSTRUCTIVE PULMONARY DISEASE, UNSPECIFIED COPD TYPE (HCC): ICD-10-CM

## 2020-03-05 DIAGNOSIS — I48.19 PERSISTENT ATRIAL FIBRILLATION (HCC): ICD-10-CM

## 2020-03-05 DIAGNOSIS — E78.5 DYSLIPIDEMIA: ICD-10-CM

## 2020-03-05 DIAGNOSIS — Z12.5 SCREENING PSA (PROSTATE SPECIFIC ANTIGEN): ICD-10-CM

## 2020-03-05 PROCEDURE — 99214 OFFICE O/P EST MOD 30 MIN: CPT | Performed by: INTERNAL MEDICINE

## 2020-03-05 RX ORDER — ATORVASTATIN CALCIUM 20 MG/1
20 TABLET, FILM COATED ORAL DAILY
Qty: 100 TAB | Refills: 3 | Status: SHIPPED | OUTPATIENT
Start: 2020-03-05 | End: 2020-09-04 | Stop reason: SDUPTHER

## 2020-03-05 RX ORDER — METHYLPREDNISOLONE 4 MG/1
TABLET ORAL
Qty: 21 TAB | Refills: 0 | Status: SHIPPED | OUTPATIENT
Start: 2020-03-05 | End: 2020-05-04 | Stop reason: SDUPTHER

## 2020-03-05 RX ORDER — MOMETASONE FUROATE AND FORMOTEROL FUMARATE DIHYDRATE 100; 5 UG/1; UG/1
1 AEROSOL RESPIRATORY (INHALATION) 2 TIMES DAILY
Qty: 3 INHALER | Refills: 3 | Status: SHIPPED | OUTPATIENT
Start: 2020-03-05 | End: 2020-09-04 | Stop reason: SDUPTHER

## 2020-03-05 ASSESSMENT — PATIENT HEALTH QUESTIONNAIRE - PHQ9: CLINICAL INTERPRETATION OF PHQ2 SCORE: 0

## 2020-03-05 NOTE — PROGRESS NOTES
CC: Follow-up COPD, dyslipidemia, atrial fibrillation.                                                                                                                                      HPI:   Jude presents today with the following.    1. Chronic obstructive pulmonary disease, unspecified COPD type (HCC)  Presents reporting he did have one exacerbation recently taking a Medrol Dosepak he is compliant with inhalers needing refills.  Denies any current fevers chills or cough.    2. Dyslipidemia  Pain on statin without myalgia well-controlled as of last check    3. Persistent atrial fibrillation  Denying any chest pains or palpitations despite being in persistent atrial fibrillation.  He is on anticoagulation.    4. Screening PSA (prostate specific antigen)        Patient Active Problem List    Diagnosis Date Noted   • Alcohol abuse      Priority: High   • JACE (obstructive sleep apnea) 06/24/2010     Priority: High   • Chronic anticoagulation      Priority: Medium   • Dyslipidemia 02/11/2019   • Chronic obstructive asthma (HCC) 08/09/2018   • Benign prostatic hyperplasia with weak urinary stream 08/09/2018   • Persistent atrial fibrillation 05/19/2017   • Iritis 03/17/2011       Current Outpatient Medications   Medication Sig Dispense Refill   • Mometasone Furo-Formoterol Fum (DULERA) 100-5 MCG/ACT Aerosol Inhale 1 Puff by mouth 2 Times a Day. 3 Inhaler 3   • atorvastatin (LIPITOR) 20 MG Tab Take 1 Tab by mouth every day. 100 Tab 3   • methylPREDNISolone (MEDROL DOSEPAK) 4 MG Tablet Therapy Pack Follow schedule on package instructions. 21 Tab 0   • VENTOLIN  (90 Base) MCG/ACT Aero Soln inhalation aerosol INHALE 2 PUFFS BY MOUTH EVERY 4 HOURS AS NEEDED FOR SHORTNESS OF BREATH /  WHEEZING 1 Inhaler 5   • rivaroxaban (XARELTO) 20 MG Tab tablet Take 1 Tab by mouth with dinner. Needs to be seen for further refills. Thank you 90 Tab 3   • metoprolol SR (TOPROL XL) 100 MG TABLET SR 24 HR Take 1 Tab by mouth  "every day. 90 Tab 3   • fluticasone (FLONASE) 50 MCG/ACT nasal spray 1-2 sprays/nostril qhs 1 Bottle 5   • azithromycin (ZITHROMAX) 250 MG Tab TAKE 2 TABS ON DAY ONE, THEN TAKE 1 TAB A DAY FOR 4 DAYS (Patient not taking: Reported on 3/5/2020) 6 Tab 0     No current facility-administered medications for this visit.          Allergies as of 03/05/2020   • (No Known Allergies)        ROS: Denies Chest pain, SOB, LE edema.    /64 (BP Location: Right arm, Patient Position: Sitting)   Pulse 68   Temp 36.2 °C (97.2 °F)   Ht 1.727 m (5' 8\")   Wt 111.6 kg (246 lb)   SpO2 94%   BMI 37.40 kg/m²     Physical Exam:  Gen:         Alert and oriented, No apparent distress.  Neck:        No Lymphadenopathy or Bruits.  Lungs:     Clear to auscultation bilaterally  CV:          Irregularly irregular and rhythm. No murmurs, rubs or gallops.               Ext:          No clubbing, cyanosis, edema.      Assessment and Plan.   59 y.o. male with the following issues.    1. Chronic obstructive pulmonary disease, unspecified COPD type (HCC)  Clinically doing well refilled inhalers follow-up PRN  - Mometasone Furo-Formoterol Fum (DULERA) 100-5 MCG/ACT Aerosol; Inhale 1 Puff by mouth 2 Times a Day.  Dispense: 3 Inhaler; Refill: 3  - methylPREDNISolone (MEDROL DOSEPAK) 4 MG Tablet Therapy Pack; Follow schedule on package instructions.  Dispense: 21 Tab; Refill: 0    2. Dyslipidemia  Refilled statin rechecking blood work before next visit  - Comp Metabolic Panel; Future  - Lipid Profile; Future  - atorvastatin (LIPITOR) 20 MG Tab; Take 1 Tab by mouth every day.  Dispense: 100 Tab; Refill: 3    3. Persistent atrial fibrillation  Continue current treatment    4. Screening PSA (prostate specific antigen)    - PROSTATE SPECIFIC AG SCREENING; Future      "

## 2020-05-04 ENCOUNTER — TELEPHONE (OUTPATIENT)
Dept: MEDICAL GROUP | Facility: MEDICAL CENTER | Age: 60
End: 2020-05-04

## 2020-05-04 DIAGNOSIS — J44.9 CHRONIC OBSTRUCTIVE PULMONARY DISEASE, UNSPECIFIED COPD TYPE (HCC): ICD-10-CM

## 2020-05-04 RX ORDER — METHYLPREDNISOLONE 4 MG/1
TABLET ORAL
Qty: 21 TAB | Refills: 1 | Status: SHIPPED | OUTPATIENT
Start: 2020-05-04 | End: 2020-08-20

## 2020-05-04 NOTE — TELEPHONE ENCOUNTER
VOICEMAIL  1. Caller Name: Jude                      Call Back Number: [phone]    2. Message: Patient is half way through a medrol dose sagar and was wondering if he needed to make an appt with you before or after he finishes it to get another refill. Please advise.    3. Patient approves office to leave a detailed voicemail/MyChart message: N\A

## 2020-07-02 ENCOUNTER — OFFICE VISIT (OUTPATIENT)
Dept: CARDIOLOGY | Facility: MEDICAL CENTER | Age: 60
End: 2020-07-02
Payer: COMMERCIAL

## 2020-07-02 VITALS
SYSTOLIC BLOOD PRESSURE: 136 MMHG | OXYGEN SATURATION: 93 % | DIASTOLIC BLOOD PRESSURE: 86 MMHG | BODY MASS INDEX: 38.04 KG/M2 | HEIGHT: 68 IN | HEART RATE: 80 BPM | WEIGHT: 251 LBS

## 2020-07-02 DIAGNOSIS — Z79.01 CHRONIC ANTICOAGULATION: ICD-10-CM

## 2020-07-02 DIAGNOSIS — G47.33 OSA (OBSTRUCTIVE SLEEP APNEA): Chronic | ICD-10-CM

## 2020-07-02 DIAGNOSIS — E78.5 DYSLIPIDEMIA: ICD-10-CM

## 2020-07-02 DIAGNOSIS — F10.10 ALCOHOL ABUSE: ICD-10-CM

## 2020-07-02 DIAGNOSIS — I48.19 PERSISTENT ATRIAL FIBRILLATION (HCC): ICD-10-CM

## 2020-07-02 DIAGNOSIS — I48.0 PAF (PAROXYSMAL ATRIAL FIBRILLATION) (HCC): ICD-10-CM

## 2020-07-02 LAB — EKG IMPRESSION: NORMAL

## 2020-07-02 PROCEDURE — 99214 OFFICE O/P EST MOD 30 MIN: CPT | Performed by: INTERNAL MEDICINE

## 2020-07-02 PROCEDURE — 93000 ELECTROCARDIOGRAM COMPLETE: CPT | Performed by: INTERNAL MEDICINE

## 2020-07-02 NOTE — PROGRESS NOTES
"Chief Complaint   Patient presents with   • Atrial Fibrillation     F/V DX:PAF       Subjective:   Jude Jiménez is a 59 y.o. male who presents today with history of persistent atrial fibrillation.  Rate controlled.  On Anticoagulation.  Sleep apnea treated.  No chest pain or shortness of breath.  No limitations.  Hyperlipidemia on statins.  Alcohol use but no hard liquor now for last year.  3 glasses of wine per night.  No hospitalizations.    Past Medical History:   Diagnosis Date   • AF (atrial fibrillation) ([facility]) 2010   • Alcohol abuse    • Arthritis     \"rhematoid eyes\"   • Atrial fibrillation ([facility]) 2014    AF with RVR   • Bronchitis 14    ?   • Chickenpox    • Chronic anticoagulation    • Cold 14   • Eye pain    • Hearing difficulty    • Hypertension    • Infectious disease    • Obesity    • JACE (obstructive sleep apnea) 2010    uses CPAP   • Other emphysema ([facility])    • PAF (paroxysmal atrial fibrillation) ([facility]) 10/1/2014   • Pneumonia 14    ?   • Sleep apnea    • Snoring    • Tonsillitis    • Wheezing      Past Surgical History:   Procedure Laterality Date   • RECOVERY  2014    Performed by Cath-Recovery Surgery at SURGERY SAME DAY [facility] ORS     Family History   Problem Relation Age of Onset   • Hypertension Mother      Social History     Socioeconomic History   • Marital status:      Spouse name: Not on file   • Number of children: Not on file   • Years of education: Not on file   • Highest education level: Not on file   Occupational History   • Not on file   Social Needs   • Financial resource strain: Not on file   • Food insecurity     Worry: Not on file     Inability: Not on file   • Transportation needs     Medical: Not on file     Non-medical: Not on file   Tobacco Use   • Smoking status: Former Smoker     Packs/day: 0.50     Years: 20.00     Pack years: 10.00     Types: Cigarettes     Last attempt to quit: 1998     Years since quittin.5 "   • Smokeless tobacco: Never Used   Substance and Sexual Activity   • Alcohol use: Yes     Alcohol/week: 5.4 oz     Types: 6 Glasses of wine, 3 Shots of liquor per week     Comment: around 6 glasses of wine every day and 2-3 shots of tequilla   • Drug use: Yes     Types: Marijuana     Comment: Marijuana every 2 weeks   • Sexual activity: Not on file   Lifestyle   • Physical activity     Days per week: Not on file     Minutes per session: Not on file   • Stress: Not on file   Relationships   • Social connections     Talks on phone: Not on file     Gets together: Not on file     Attends Protestant service: Not on file     Active member of club or organization: Not on file     Attends meetings of clubs or organizations: Not on file     Relationship status: Not on file   • Intimate partner violence     Fear of current or ex partner: Not on file     Emotionally abused: Not on file     Physically abused: Not on file     Forced sexual activity: Not on file   Other Topics Concern   • Not on file   Social History Narrative   • Not on file     No Known Allergies  Outpatient Encounter Medications as of 7/2/2020   Medication Sig Dispense Refill   • methylPREDNISolone (MEDROL DOSEPAK) 4 MG Tablet Therapy Pack Follow schedule on package instructions. 21 Tab 1   • Mometasone Furo-Formoterol Fum (DULERA) 100-5 MCG/ACT Aerosol Inhale 1 Puff by mouth 2 Times a Day. 3 Inhaler 3   • atorvastatin (LIPITOR) 20 MG Tab Take 1 Tab by mouth every day. 100 Tab 3   • VENTOLIN  (90 Base) MCG/ACT Aero Soln inhalation aerosol INHALE 2 PUFFS BY MOUTH EVERY 4 HOURS AS NEEDED FOR SHORTNESS OF BREATH /  WHEEZING 1 Inhaler 5   • rivaroxaban (XARELTO) 20 MG Tab tablet Take 1 Tab by mouth with dinner. Needs to be seen for further refills. Thank you 90 Tab 3   • metoprolol SR (TOPROL XL) 100 MG TABLET SR 24 HR Take 1 Tab by mouth every day. 90 Tab 3   • [DISCONTINUED] azithromycin (ZITHROMAX) 250 MG Tab TAKE 2 TABS ON DAY ONE, THEN TAKE 1 TAB A DAY  "FOR 4 DAYS (Patient not taking: Reported on 3/5/2020) 6 Tab 0   • [DISCONTINUED] fluticasone (FLONASE) 50 MCG/ACT nasal spray 1-2 sprays/nostril qhs (Patient not taking: Reported on 7/2/2020) 1 Bottle 5     No facility-administered encounter medications on file as of 7/2/2020.      ROS     Objective:   /86 (BP Location: Right arm, Patient Position: Sitting, BP Cuff Size: Large adult)   Pulse 80   Ht 1.727 m (5' 8\")   Wt 113.9 kg (251 lb)   SpO2 93%   BMI 38.16 kg/m²     Physical Exam   Constitutional: He is oriented to person, place, and time. He appears well-developed and well-nourished.   HENT:   Head: Normocephalic and atraumatic.   Eyes: EOM are normal.   Neck: Normal range of motion. Neck supple.   Cardiovascular: Normal rate and intact distal pulses. An irregularly irregular rhythm present. Exam reveals no gallop and no friction rub.   No murmur heard.  Pulmonary/Chest: Effort normal and breath sounds normal.   Abdominal: Soft.   Musculoskeletal: Normal range of motion.         General: No edema.   Neurological: He is alert and oriented to person, place, and time.   Skin: Skin is warm and dry.   Psychiatric: He has a normal mood and affect. His behavior is normal. Judgment and thought content normal.       Assessment:     1. PAF (paroxysmal atrial fibrillation) (HCC)  EKG   2. Persistent atrial fibrillation (HCC)     3. Chronic anticoagulation     4. Alcohol abuse     5. Dyslipidemia  Lipid Profile    THYROID PANEL    Comp Metabolic Panel   6. JACE (obstructive sleep apnea)         Medical Decision Making:  Today's Assessment / Status / Plan:   1.  Persistent atrial fibrillation.  Continue rate control.  Briefly discussed rhythm control.  Currently not interested.  Pretty much asymptomatic.  2.  Anticoagulation continue Noac.  3.  Sleep apnea treated.  4.  Hyperlipidemia continue statins check labs.  Also TFTs.  5.  Follow-up in 1 year.  "

## 2020-08-20 DIAGNOSIS — J44.9 CHRONIC OBSTRUCTIVE PULMONARY DISEASE, UNSPECIFIED COPD TYPE (HCC): ICD-10-CM

## 2020-08-21 RX ORDER — METHYLPREDNISOLONE 4 MG/1
TABLET ORAL
Qty: 21 EACH | Refills: 0 | Status: SHIPPED | OUTPATIENT
Start: 2020-08-21 | End: 2020-09-04 | Stop reason: SDUPTHER

## 2020-09-02 ENCOUNTER — HOSPITAL ENCOUNTER (OUTPATIENT)
Dept: LAB | Facility: MEDICAL CENTER | Age: 60
End: 2020-09-02
Attending: INTERNAL MEDICINE
Payer: COMMERCIAL

## 2020-09-02 DIAGNOSIS — Z12.5 SCREENING PSA (PROSTATE SPECIFIC ANTIGEN): ICD-10-CM

## 2020-09-02 DIAGNOSIS — E78.5 DYSLIPIDEMIA: ICD-10-CM

## 2020-09-02 LAB
ALBUMIN SERPL BCP-MCNC: 4.2 G/DL (ref 3.2–4.9)
ALBUMIN/GLOB SERPL: 1.4 G/DL
ALP SERPL-CCNC: 78 U/L (ref 30–99)
ALT SERPL-CCNC: 42 U/L (ref 2–50)
ANION GAP SERPL CALC-SCNC: 13 MMOL/L (ref 7–16)
AST SERPL-CCNC: 30 U/L (ref 12–45)
BILIRUB SERPL-MCNC: 0.6 MG/DL (ref 0.1–1.5)
BUN SERPL-MCNC: 20 MG/DL (ref 8–22)
CALCIUM SERPL-MCNC: 9.2 MG/DL (ref 8.5–10.5)
CHLORIDE SERPL-SCNC: 103 MMOL/L (ref 96–112)
CHOLEST SERPL-MCNC: 163 MG/DL (ref 100–199)
CO2 SERPL-SCNC: 24 MMOL/L (ref 20–33)
CREAT SERPL-MCNC: 0.68 MG/DL (ref 0.5–1.4)
FASTING STATUS PATIENT QL REPORTED: NORMAL
GLOBULIN SER CALC-MCNC: 3 G/DL (ref 1.9–3.5)
GLUCOSE SERPL-MCNC: 79 MG/DL (ref 65–99)
HDLC SERPL-MCNC: 68 MG/DL
LDLC SERPL CALC-MCNC: 79 MG/DL
POTASSIUM SERPL-SCNC: 4.4 MMOL/L (ref 3.6–5.5)
PROT SERPL-MCNC: 7.2 G/DL (ref 6–8.2)
PSA SERPL-MCNC: 0.3 NG/ML (ref 0–4)
SODIUM SERPL-SCNC: 140 MMOL/L (ref 135–145)
T4 FREE SERPL-MCNC: 1.14 NG/DL (ref 0.93–1.7)
TRIGL SERPL-MCNC: 82 MG/DL (ref 0–149)

## 2020-09-02 PROCEDURE — 80053 COMPREHEN METABOLIC PANEL: CPT

## 2020-09-02 PROCEDURE — 84153 ASSAY OF PSA TOTAL: CPT

## 2020-09-02 PROCEDURE — 80061 LIPID PANEL: CPT

## 2020-09-02 PROCEDURE — 36415 COLL VENOUS BLD VENIPUNCTURE: CPT

## 2020-09-02 PROCEDURE — 84439 ASSAY OF FREE THYROXINE: CPT

## 2020-09-04 ENCOUNTER — OFFICE VISIT (OUTPATIENT)
Dept: MEDICAL GROUP | Facility: MEDICAL CENTER | Age: 60
End: 2020-09-04
Payer: COMMERCIAL

## 2020-09-04 VITALS
OXYGEN SATURATION: 97 % | HEART RATE: 67 BPM | SYSTOLIC BLOOD PRESSURE: 138 MMHG | TEMPERATURE: 97 F | DIASTOLIC BLOOD PRESSURE: 80 MMHG | HEIGHT: 68 IN | BODY MASS INDEX: 37.42 KG/M2 | WEIGHT: 246.91 LBS

## 2020-09-04 DIAGNOSIS — E78.5 DYSLIPIDEMIA: ICD-10-CM

## 2020-09-04 DIAGNOSIS — I48.0 PAF (PAROXYSMAL ATRIAL FIBRILLATION) (HCC): ICD-10-CM

## 2020-09-04 DIAGNOSIS — J44.9 CHRONIC OBSTRUCTIVE PULMONARY DISEASE, UNSPECIFIED COPD TYPE (HCC): ICD-10-CM

## 2020-09-04 DIAGNOSIS — N52.9 ERECTILE DYSFUNCTION, UNSPECIFIED ERECTILE DYSFUNCTION TYPE: ICD-10-CM

## 2020-09-04 PROCEDURE — 99214 OFFICE O/P EST MOD 30 MIN: CPT | Performed by: INTERNAL MEDICINE

## 2020-09-04 RX ORDER — ALBUTEROL SULFATE 90 UG/1
2 AEROSOL, METERED RESPIRATORY (INHALATION) EVERY 6 HOURS PRN
Qty: 3 EACH | Refills: 3 | Status: SHIPPED | OUTPATIENT
Start: 2020-09-04

## 2020-09-04 RX ORDER — METHYLPREDNISOLONE 4 MG/1
TABLET ORAL
Qty: 21 EACH | Refills: 1 | Status: SHIPPED | OUTPATIENT
Start: 2020-09-04 | End: 2020-12-30 | Stop reason: SDUPTHER

## 2020-09-04 RX ORDER — SILDENAFIL 100 MG/1
100 TABLET, FILM COATED ORAL PRN
Qty: 10 TAB | Refills: 3 | Status: SHIPPED | OUTPATIENT
Start: 2020-09-04 | End: 2020-09-10 | Stop reason: SDUPTHER

## 2020-09-04 RX ORDER — METOPROLOL SUCCINATE 100 MG/1
100 TABLET, EXTENDED RELEASE ORAL DAILY
Qty: 90 TAB | Refills: 3 | Status: SHIPPED | OUTPATIENT
Start: 2020-09-04

## 2020-09-04 RX ORDER — ATORVASTATIN CALCIUM 20 MG/1
20 TABLET, FILM COATED ORAL DAILY
Qty: 100 TAB | Refills: 3 | Status: SHIPPED | OUTPATIENT
Start: 2020-09-04

## 2020-09-04 RX ORDER — MOMETASONE FUROATE AND FORMOTEROL FUMARATE DIHYDRATE 100; 5 UG/1; UG/1
1 AEROSOL RESPIRATORY (INHALATION) 2 TIMES DAILY
Qty: 3 EACH | Refills: 3 | Status: SHIPPED | OUTPATIENT
Start: 2020-09-04

## 2020-09-04 NOTE — PROGRESS NOTES
CC: Follow-up COPD and dyslipidemia planing of erectile issues.                                                                                                                                      HPI:   Jude presents today with the following.    1. Erectile dysfunction, unspecified erectile dysfunction type  Reports difficulty obtaining as well as maintaining erection.  Reports libido is intact.  Denies any chest pain or shortness of breath he does get physical exertion without symptoms.    2. Dyslipidemia  Maintained on statin well-controlled on recent blood work LDL in range.  Denying any myalgias.    3. Chronic obstructive pulmonary disease, unspecified COPD type (HCC)  He is maintained on regular inhalers reports some mild intermittent problems with the smoke he has used a Medrol Dosepak recently.  He does have more medications at home.  No current cough or shortness of breath.    4. PAF (paroxysmal atrial fibrillation) ([facility])  Rate control followed by cardiology maintained on blood thinners.      Patient Active Problem List    Diagnosis Date Noted   • Alcohol abuse      Priority: High   • JACE (obstructive sleep apnea) 06/24/2010     Priority: High   • Chronic anticoagulation      Priority: Medium   • Dyslipidemia 02/11/2019   • Chronic obstructive asthma (HCC) 08/09/2018   • Benign prostatic hyperplasia with weak urinary stream 08/09/2018   • Persistent atrial fibrillation (HCC) 05/19/2017   • Iritis 03/17/2011       Current Outpatient Medications   Medication Sig Dispense Refill   • metoprolol SR (TOPROL XL) 100 MG TABLET SR 24 HR Take 1 Tab by mouth every day. 90 Tab 3   • VENTOLIN  (90 Base) MCG/ACT Aero Soln inhalation aerosol Inhale 2 Puffs by mouth every 6 hours as needed for Shortness of Breath. 3 Each 3   • atorvastatin (LIPITOR) 20 MG Tab Take 1 Tab by mouth every day. 100 Tab 3   • Mometasone Furo-Formoterol Fum (DULERA) 100-5 MCG/ACT Aerosol Inhale 1 Puff by mouth 2 Times a Day. 3  "Each 3   • methylPREDNISolone (MEDROL DOSEPAK) 4 MG Tablet Therapy Pack TAKE BY MOUTH AS DIRECTED ON INSIDE OF PACKAGE 21 Each 1   • sildenafil citrate (VIAGRA) 100 MG tablet Take 1 Tab by mouth as needed for Erectile Dysfunction. 10 Tab 3   • rivaroxaban (XARELTO) 20 MG Tab tablet Take 1 Tab by mouth with dinner. Needs to be seen for further refills. Thank you 90 Tab 3     No current facility-administered medications for this visit.          Allergies as of 09/04/2020   • (No Known Allergies)        ROS: Denies Chest pain, SOB, LE edema.    /80 (BP Location: Right arm, Patient Position: Sitting)   Pulse 67   Temp 36.1 °C (97 °F)   Ht 1.727 m (5' 8\")   Wt 112 kg (246 lb 14.6 oz)   SpO2 97%   BMI 37.54 kg/m²     Physical Exam:  Gen:         Alert and oriented, No apparent distress.  Neck:        No Lymphadenopathy or Bruits.  Lungs:     Clear to auscultation bilaterally  CV:          Regular rate and rhythm. No murmurs, rubs or gallops.               Ext:          No clubbing, cyanosis, edema.      Assessment and Plan.   59 y.o. male with the following issues.    1. Erectile dysfunction, unspecified erectile dysfunction type  Discussion about medications have given a prescription for Viagra cautioned about side effects and interactions.  - sildenafil citrate (VIAGRA) 100 MG tablet; Take 1 Tab by mouth as needed for Erectile Dysfunction.  Dispense: 10 Tab; Refill: 3    2. Dyslipidemia  Lipids currently well controlled. Discussed continued diet and exercise recheck 6 months to 1 year.  - atorvastatin (LIPITOR) 20 MG Tab; Take 1 Tab by mouth every day.  Dispense: 100 Tab; Refill: 3    3. Chronic obstructive pulmonary disease, unspecified COPD type ([facility])  Stable refilled inhalers  - VENTOLIN  (90 Base) MCG/ACT Aero Soln inhalation aerosol; Inhale 2 Puffs by mouth every 6 hours as needed for Shortness of Breath.  Dispense: 3 Each; Refill: 3  - Mometasone Furo-Formoterol Fum (DULERA) 100-5 MCG/ACT " Aerosol; Inhale 1 Puff by mouth 2 Times a Day.  Dispense: 3 Each; Refill: 3  - methylPREDNISolone (MEDROL DOSEPAK) 4 MG Tablet Therapy Pack; TAKE BY MOUTH AS DIRECTED ON INSIDE OF PACKAGE  Dispense: 21 Each; Refill: 1    4. PAF (paroxysmal atrial fibrillation) ([facility])  Refilled metoprolol clinically doing well no symptomatic  - metoprolol SR (TOPROL XL) 100 MG TABLET SR 24 HR; Take 1 Tab by mouth every day.  Dispense: 90 Tab; Refill: 3

## 2020-09-10 DIAGNOSIS — N52.9 ERECTILE DYSFUNCTION, UNSPECIFIED ERECTILE DYSFUNCTION TYPE: ICD-10-CM

## 2020-09-10 RX ORDER — SILDENAFIL 100 MG/1
100 TABLET, FILM COATED ORAL PRN
Qty: 10 TAB | Refills: 3 | Status: SHIPPED | OUTPATIENT
Start: 2020-09-10

## 2020-11-30 ENCOUNTER — SLEEP CENTER VISIT (OUTPATIENT)
Dept: SLEEP MEDICINE | Facility: MEDICAL CENTER | Age: 60
End: 2020-11-30
Payer: COMMERCIAL

## 2020-11-30 VITALS
BODY MASS INDEX: 37.74 KG/M2 | RESPIRATION RATE: 16 BRPM | WEIGHT: 249 LBS | HEART RATE: 79 BPM | SYSTOLIC BLOOD PRESSURE: 130 MMHG | DIASTOLIC BLOOD PRESSURE: 64 MMHG | OXYGEN SATURATION: 95 % | HEIGHT: 68 IN

## 2020-11-30 DIAGNOSIS — I48.19 PERSISTENT ATRIAL FIBRILLATION (HCC): ICD-10-CM

## 2020-11-30 DIAGNOSIS — J44.89 CHRONIC OBSTRUCTIVE ASTHMA (HCC): Chronic | ICD-10-CM

## 2020-11-30 DIAGNOSIS — Z87.891 FORMER SMOKER: ICD-10-CM

## 2020-11-30 DIAGNOSIS — G47.33 OSA (OBSTRUCTIVE SLEEP APNEA): Chronic | ICD-10-CM

## 2020-11-30 PROCEDURE — 99214 OFFICE O/P EST MOD 30 MIN: CPT | Performed by: NURSE PRACTITIONER

## 2020-11-30 NOTE — PROGRESS NOTES
Chief Complaint   Patient presents with   • Apnea     Last Seen 11/26/19       HPI:  Jude Jiménez is a 60 y.o. year old male here today for follow-up on JACE and asthma/COPD.  Last OV 11/26/19. See note 8/20/19 by Dr. Farias.     Hx of SHRAVAN Holman and diagnosed with JACE >10yrs ago.  PSG indicated severe JACE with an RDI of 96.9/h.  He is currently using auto CPAP 10 to 16 cm nightly. Current device 3 yrs old.  Compliance card 10/31/2020 through 11/29/2020 indicates 100% compliance, average nightly use 8 hours 50 minutes, mean pressure 10.6 cm, significant mask leak of greater than 2 hours per night with an overall AHI 7.9/h.  On detailed report patient has significant mask leak 2 to 3 weeks ago this significantly improved in the last week.  I reviewed finds with patient.  He tolerates mask and pressure well. He denies AM headaches. He notes consistent energy and no napping/dozing. 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 Overall he feels he sleeps well on therapy.    In regards to pulmonary status, former smoker, quit 1998 with 10-pack-year history.  Chest x-ray July 2017 indicated cardiomegaly with no acute findings.  Echo 10/3/2019 indicated EF of 60% with atrial fibrillation occurring and unable to evaluate diastolic function.  RVSP 32 mmHg.  Dilated aortic root at 4.2 cm.  PFT 8/20/2019 indicated moderate obstructive ventilatory defect with FVC 3.09 L or 70%, FEV1 1.91 L or 57%, FEV1/FC ratio 62% with no significant bronchodilator response.  Flow volume loop was consistent with obstruction.  He remains on Dulera 100/5 mcg 2 puff 2 times daily, and albuterol HFA inhaler as needed. He has not required CL[place] very much since air quality improved.   He notes possibly having a chest cold the last couple weeks which is very mild now with some throat clearing when he lays down. Denies GERD. Overall feels breathing is stable without chest tightness/pain/wheezing.  He does have a medrol dosepak on hand for acute symptoms from  "PCP.  He is also on Xarelto and metoprolol chronically for persistent A. fib.  He notes when there was poor air quality he was having difficulty with breathing and required medrol dosepak 2x's to resolve symptoms.    ROS: As per HPI and otherwise negative if not stated.    Past Medical History:   Diagnosis Date   • AF (atrial fibrillation) ([facility]) 2010   • Alcohol abuse    • Arthritis     \"rhematoid eyes\"   • Atrial fibrillation ([facility]) 2014    AF with RVR   • Bronchitis 14    ?   • Chickenpox    • Chronic anticoagulation    • Cold 14   • Eye pain    • Hearing difficulty    • Hypertension    • Infectious disease    • Obesity    • JACE (obstructive sleep apnea) 2010    uses CPAP   • Other emphysema ([facility])    • PAF (paroxysmal atrial fibrillation) ([facility]) 10/1/2014   • Pneumonia 14    ?   • Sleep apnea    • Snoring    • Tonsillitis    • Wheezing        Past Surgical History:   Procedure Laterality Date   • RECOVERY  2014    Performed by Cath-Recovery Surgery at SURGERY SAME DAY [facility] ORS       Family History   Problem Relation Age of Onset   • Hypertension Mother        Social History     Socioeconomic History   • Marital status:      Spouse name: Not on file   • Number of children: Not on file   • Years of education: Not on file   • Highest education level: Not on file   Occupational History   • Not on file   Social Needs   • Financial resource strain: Not on file   • Food insecurity     Worry: Not on file     Inability: Not on file   • Transportation needs     Medical: Not on file     Non-medical: Not on file   Tobacco Use   • Smoking status: Former Smoker     Packs/day: 0.50     Years: 20.00     Pack years: 10.00     Types: Cigarettes     Quit date: 1998     Years since quittin.9   • Smokeless tobacco: Never Used   Substance and Sexual Activity   • Alcohol use: Yes     Alcohol/week: 5.4 oz     Types: 6 Glasses of wine, 3 Shots of liquor per week     Comment: around " "6 glasses of wine every day and 2-3 shots of tequilla   • Drug use: Yes     Types: Marijuana     Comment: Marijuana every 2 weeks   • Sexual activity: Not on file   Lifestyle   • Physical activity     Days per week: Not on file     Minutes per session: Not on file   • Stress: Not on file   Relationships   • Social connections     Talks on phone: Not on file     Gets together: Not on file     Attends [employer] service: Not on file     Active member of club or organization: Not on file     Attends meetings of clubs or organizations: Not on file     Relationship status: Not on file   • Intimate partner violence     Fear of current or ex partner: Not on file     Emotionally abused: Not on file     Physically abused: Not on file     Forced sexual activity: Not on file   Other Topics Concern   • Not on file   Social History Narrative   • Not on file       Allergies as of 11/30/2020   • (No Known Allergies)        Vitals:  /64 (BP Location: Left arm, Patient Position: Sitting, BP Cuff Size: Adult)   Pulse 79   Resp 16   Ht 1.727 m (5' 8\")   Wt 112.9 kg (249 lb)   SpO2 95%     Current medications as of today   Current Outpatient Medications   Medication Sig Dispense Refill   • sildenafil citrate (VIAGRA) 100 MG tablet Take 1 Tab by mouth as needed for Erectile Dysfunction. 10 Tab 3   • metoprolol SR (TOPROL XL) 100 MG TABLET SR 24 HR Take 1 Tab by mouth every day. 90 Tab 3   • VENTOLIN  (90 Base) MCG/ACT Aero Soln inhalation aerosol Inhale 2 Puffs by mouth every 6 hours as needed for Shortness of Breath. 3 Each 3   • atorvastatin (LIPITOR) 20 MG Tab Take 1 Tab by mouth every day. 100 Tab 3   • Mometasone Furo-Formoterol Fum (DULERA) 100-5 MCG/ACT Aerosol Inhale 1 Puff by mouth 2 Times a Day. 3 Each 3   • methylPREDNISolone (MEDROL DOSEPAK) 4 MG Tablet Therapy Pack TAKE BY MOUTH AS DIRECTED ON INSIDE OF PACKAGE 21 Each 1   • rivaroxaban (XARELTO) 20 MG Tab tablet Take 1 Tab by mouth with dinner. Needs to be " seen for further refills. Thank you 90 Tab 3     No current facility-administered medications for this visit.          Physical Exam:   Gen:           Alert and oriented, No apparent distress. Mood and affect appropriate, normal interaction with examiner.  Eyes:          PERRL, EOM intact, sclere white, conjunctive moist.  Ears:          Not examined.   Hearing:     Grossly intact.  Nose:          Normal, no lesions or deformities.  Dentition:    Good dentition.  Oropharynx:   mask  Mallampati Classification: mask  Neck:        Supple, trachea midline, no masses.  Respiratory Effort: No intercostal retractions or use of accessory muscles.   Lung Auscultation:      Clear to auscultation bilaterally; no rales, rhonchi or wheezing.  CV:            A.Fib.  Abd:           Not examined.   Lymphadenopathy: Not examined.  Gait and Station: Normal.  Digits and Nails: No clubbing, cyanosis, petechiae, or nodes.   Cranial Nerves: II-XII grossly intact.  Skin:        No rashes, lesions or ulcers noted.               Ext:           No cyanosis or edema.      Assessment:  1. JACE (obstructive sleep apnea)     2. Chronic obstructive asthma (HCC)     3. Persistent atrial fibrillation (HCC)     4. BMI 37.0-37.9, adult  Height And Weight   5. Former smoker         Immunizations:    Flu:9/2020  Pneumovax 23:2015  Prevnar 13:2019    Plan:  1.  Sleep apnea is clinically stable.  Continue CPAP nightly.  DME mask/place.  2.  Chronic obstructive asthma is clinically stable and patient will remain on current bronchodilators.  PCP recently gave refills.  Reviewed appropriate use.  Medrol Dosepak on hand for acute symptoms.  Reviewed appropriate use.  3.  Follow-up with cardiology for ongoing monitoring of A. fib.  Patient notes symptoms to be stable.  4.  Encourage weight loss or diet exercise.  5.  Follow-up in 1 year with compliance report/PFT results, sooner if needed.    Please note that this dictation was created using voice recognition  software. I have made every reasonable attempt to correct obvious errors, but it is possible there are errors of grammar and possibly content that I did not discover before finalizing the note.

## 2020-12-17 DIAGNOSIS — I48.0 PAF (PAROXYSMAL ATRIAL FIBRILLATION) (HCC): ICD-10-CM

## 2020-12-17 DIAGNOSIS — Z79.01 CHRONIC ANTICOAGULATION: ICD-10-CM

## 2020-12-18 RX ORDER — RIVAROXABAN 20 MG/1
TABLET, FILM COATED ORAL
Qty: 90 TAB | Refills: 2 | Status: SHIPPED | OUTPATIENT
Start: 2020-12-18 | End: 2021-09-30

## 2020-12-30 DIAGNOSIS — J44.9 CHRONIC OBSTRUCTIVE PULMONARY DISEASE, UNSPECIFIED COPD TYPE (HCC): ICD-10-CM

## 2020-12-30 RX ORDER — METHYLPREDNISOLONE 4 MG/1
TABLET ORAL
Qty: 21 EACH | Refills: 0 | Status: SHIPPED | OUTPATIENT
Start: 2020-12-30 | End: 2021-01-31

## 2021-01-31 DIAGNOSIS — J44.9 CHRONIC OBSTRUCTIVE PULMONARY DISEASE, UNSPECIFIED COPD TYPE (HCC): ICD-10-CM

## 2021-01-31 RX ORDER — METHYLPREDNISOLONE 4 MG/1
TABLET ORAL
Qty: 21 TAB | Refills: 0 | Status: SHIPPED | OUTPATIENT
Start: 2021-01-31 | End: 2021-03-02 | Stop reason: SDUPTHER

## 2021-03-02 DIAGNOSIS — J44.9 CHRONIC OBSTRUCTIVE PULMONARY DISEASE, UNSPECIFIED COPD TYPE (HCC): ICD-10-CM

## 2021-03-02 RX ORDER — METHYLPREDNISOLONE 4 MG/1
TABLET ORAL
Qty: 21 TABLET | Refills: 0 | Status: SHIPPED | OUTPATIENT
Start: 2021-03-02 | End: 2021-04-16 | Stop reason: SDUPTHER

## 2021-03-02 NOTE — TELEPHONE ENCOUNTER
Pt called and lm. Pt c/o being in distress right now. Pt would like a RF on Methylprednisolone 4mg tab. ASAP.     03/02/21: Called and spoke with pt. Pt said his symptoms are mild of SOB/Hard of breathing. He is goo right now. He would like a RF on this for emergency on hand.       Have we ever prescribed this med? NO  If yes, what date?     Last OV: 11/30/20 with Loyda CORDON    Next OV: 12/06/21 with Loyda CORDON    DX:     Medications:   Requested Prescriptions     Pending Prescriptions Disp Refills   • methylPREDNISolone (MEDROL DOSEPAK) 4 MG Tablet Therapy Pack 21 tablet 0     Sig: Follow schedule on package instructions.

## 2021-04-12 DIAGNOSIS — J44.9 CHRONIC OBSTRUCTIVE PULMONARY DISEASE, UNSPECIFIED COPD TYPE (HCC): ICD-10-CM

## 2021-04-12 NOTE — TELEPHONE ENCOUNTER
Called and lvm to sse if pt was having symptoms to need Rx or if he wanted to have on hand. Advised he call back.

## 2021-04-14 RX ORDER — METHYLPREDNISOLONE 4 MG/1
TABLET ORAL
Refills: 0 | OUTPATIENT
Start: 2021-04-14

## 2021-04-16 RX ORDER — METHYLPREDNISOLONE 4 MG/1
TABLET ORAL
Qty: 21 TABLET | Refills: 0 | Status: SHIPPED | OUTPATIENT
Start: 2021-04-16 | End: 2021-06-19 | Stop reason: ALTCHOICE

## 2021-06-19 ENCOUNTER — OFFICE VISIT (OUTPATIENT)
Dept: URGENT CARE | Facility: PHYSICIAN GROUP | Age: 61
End: 2021-06-19
Payer: COMMERCIAL

## 2021-06-19 VITALS
BODY MASS INDEX: 37.1 KG/M2 | TEMPERATURE: 98.3 F | SYSTOLIC BLOOD PRESSURE: 132 MMHG | HEART RATE: 99 BPM | DIASTOLIC BLOOD PRESSURE: 92 MMHG | OXYGEN SATURATION: 96 % | WEIGHT: 244 LBS | RESPIRATION RATE: 16 BRPM

## 2021-06-19 DIAGNOSIS — J44.1 CHRONIC OBSTRUCTIVE PULMONARY DISEASE WITH ACUTE EXACERBATION (HCC): ICD-10-CM

## 2021-06-19 DIAGNOSIS — J40 BRONCHITIS: ICD-10-CM

## 2021-06-19 DIAGNOSIS — J02.9 PHARYNGITIS, UNSPECIFIED ETIOLOGY: ICD-10-CM

## 2021-06-19 LAB
INT CON NEG: NORMAL
INT CON POS: NORMAL
S PYO AG THROAT QL: NORMAL

## 2021-06-19 PROCEDURE — 87880 STREP A ASSAY W/OPTIC: CPT | Performed by: PHYSICIAN ASSISTANT

## 2021-06-19 PROCEDURE — 99214 OFFICE O/P EST MOD 30 MIN: CPT | Performed by: PHYSICIAN ASSISTANT

## 2021-06-19 RX ORDER — METHYLPREDNISOLONE 4 MG/1
TABLET ORAL
Qty: 21 TABLET | Refills: 0 | Status: SHIPPED | OUTPATIENT
Start: 2021-06-19 | End: 2022-01-28

## 2021-06-19 RX ORDER — DOXYCYCLINE HYCLATE 100 MG
100 TABLET ORAL 2 TIMES DAILY
Qty: 14 TABLET | Refills: 0 | Status: SHIPPED | OUTPATIENT
Start: 2021-06-19 | End: 2021-06-26

## 2021-06-19 ASSESSMENT — ENCOUNTER SYMPTOMS
ABDOMINAL PAIN: 0
APPETITE CHANGE: 0
PALPITATIONS: 0
TROUBLE SWALLOWING: 0
SPUTUM PRODUCTION: 1
SORE THROAT: 1
HEMOPTYSIS: 0
DIARRHEA: 0
MYALGIAS: 0
COUGH: 1
EYE PAIN: 0
SHORTNESS OF BREATH: 1
FREQUENT THROAT CLEARING: 1
VOMITING: 0
WHEEZING: 1
DIZZINESS: 0
CHILLS: 1
FEVER: 0
PND: 0
HEADACHES: 0
NAUSEA: 0
BLURRED VISION: 0
SINUS PAIN: 0

## 2021-06-19 ASSESSMENT — COPD QUESTIONNAIRES: COPD: 1

## 2021-06-19 NOTE — PROGRESS NOTES
Subjective:      Jone Jiménez is a 60 y.o. male who presents with Shortness of Breath (started about 1 week ago. Hx of COPD. Productive cough started 2 days ago. Has improved today. Hoping for a med refill. ([facility]))    COPD  He complains of cough, frequent throat clearing, shortness of breath, sputum production and wheezing. There is no hemoptysis. This is a recurrent problem. The current episode started in the past 7 days (Worsening shortness of breath started 6 days ago and the productive cough started 2 to 3 days ago). The problem occurs constantly. The problem has been gradually worsening. The cough is productive of sputum. Associated symptoms include malaise/fatigue, nasal congestion and a sore throat. Pertinent negatives include no appetite change, chest pain, ear pain, fever, headaches, myalgias, PND or trouble swallowing. His symptoms are aggravated by any activity and lying down. His symptoms are alleviated by beta-agonist. He reports minimal improvement on treatment. Risk factors for lung disease include smoking/tobacco exposure. His past medical history is significant for bronchitis and COPD.       Review of Systems   Constitutional: Positive for chills and malaise/fatigue. Negative for appetite change and fever.   HENT: Positive for congestion and sore throat. Negative for ear pain, sinus pain and trouble swallowing.    Eyes: Negative for blurred vision and pain.   Respiratory: Positive for cough, sputum production, shortness of breath and wheezing. Negative for hemoptysis.    Cardiovascular: Negative for chest pain, palpitations and PND.   Gastrointestinal: Negative for abdominal pain, diarrhea, nausea and vomiting.   Musculoskeletal: Negative for myalgias.   Skin: Negative for rash.   Neurological: Negative for dizziness and headaches.       PMH:  has a past medical history of AF (atrial fibrillation) ([facility]) (6/24/2010), Alcohol abuse, Arthritis, Atrial fibrillation (HCC) (July 11, 2014),  Bronchitis (6/25/14), Chickenpox, Chronic anticoagulation, Cold (6/25/14), Eye pain, Hearing difficulty, Hypertension, Infectious disease, Obesity, JACE (obstructive sleep apnea) (6/24/2010), Other emphysema ([facility]), PAF (paroxysmal atrial fibrillation) ([facility]) (10/1/2014), Pneumonia (6/25/14), Sleep apnea, Snoring, Tonsillitis, and Wheezing.  MEDS:   Current Outpatient Medications:   •  methylPREDNISolone (MEDROL DOSEPAK) 4 MG Tablet Therapy Pack, Follow schedule on package instructions., Disp: 21 tablet, Rfl: 0  •  doxycycline (VIBRAMYCIN) 100 MG Tab, Take 1 tablet by mouth 2 times a day for 7 days., Disp: 14 tablet, Rfl: 0  •  XARELTO 20 MG Tab tablet, TAKE 1 TABLET BY MOUTH ONCE DAILY WITH  DINNER  **NEEDS  TO  BE  SEEN  FOR  FURTHER  REFILLS**, Disp: 90 Tab, Rfl: 2  •  sildenafil citrate (VIAGRA) 100 MG tablet, Take 1 Tab by mouth as needed for Erectile Dysfunction., Disp: 10 Tab, Rfl: 3  •  metoprolol SR (TOPROL XL) 100 MG TABLET SR 24 HR, Take 1 Tab by mouth every day., Disp: 90 Tab, Rfl: 3  •  VENTOLIN  (90 Base) MCG/ACT Aero Soln inhalation aerosol, Inhale 2 Puffs by mouth every 6 hours as needed for Shortness of Breath., Disp: 3 Each, Rfl: 3  •  atorvastatin (LIPITOR) 20 MG Tab, Take 1 Tab by mouth every day., Disp: 100 Tab, Rfl: 3  •  Mometasone Furo-Formoterol Fum (DULERA) 100-5 MCG/ACT Aerosol, Inhale 1 Puff by mouth 2 Times a Day., Disp: 3 Each, Rfl: 3  ALLERGIES: No Known Allergies  SURGHX:   Past Surgical History:   Procedure Laterality Date   • RECOVERY  8/7/2014    Performed by Cath-Recovery Surgery at SURGERY SAME DAY [facility]     SOCHX:  reports that he quit smoking about 23 years ago. His smoking use included cigarettes. He has a 10.00 pack-year smoking history. He has never used smokeless tobacco. He reports current alcohol use of about 5.4 oz of alcohol per week. He reports current drug use. Drug: Marijuana.  FH: Family history was reviewed, no pertinent findings to report     Objective:      /92   Pulse 99   Temp 36.8 °C (98.3 °F)   Resp 16   Wt 111 kg (244 lb)   SpO2 96%   BMI 37.10 kg/m²      Physical Exam  Constitutional:       Appearance: He is well-developed.   HENT:      Head: Normocephalic and atraumatic.      Right Ear: Tympanic membrane, ear canal and external ear normal.      Left Ear: Tympanic membrane, ear canal and external ear normal.      Nose: Mucosal edema, congestion and rhinorrhea present. Rhinorrhea is clear.      Mouth/Throat:      Lips: Pink.      Mouth: Mucous membranes are moist.      Pharynx: Uvula midline. Posterior oropharyngeal erythema present. No uvula swelling.   Eyes:      Conjunctiva/sclera: Conjunctivae normal.      Pupils: Pupils are equal, round, and reactive to light.   Cardiovascular:      Rate and Rhythm: Normal rate and regular rhythm.      Heart sounds: Normal heart sounds. No murmur heard.     Pulmonary:      Effort: Pulmonary effort is normal.      Breath sounds: Wheezing present.   Musculoskeletal:      Cervical back: Normal range of motion.   Lymphadenopathy:      Cervical: No cervical adenopathy.   Skin:     General: Skin is warm and dry.      Capillary Refill: Capillary refill takes less than 2 seconds.   Neurological:      Mental Status: He is alert and oriented to person, place, and time.   Psychiatric:         Behavior: Behavior normal.         Judgment: Judgment normal.          POCT Rapid Strep A - Negative     Assessment/Plan:     1. Pharyngitis, unspecified etiology  - POCT Rapid Strep A    2. Chronic obstructive pulmonary disease with acute exacerbation (HCC)  - methylPREDNISolone (MEDROL DOSEPAK) 4 MG Tablet Therapy Pack; Follow schedule on package instructions.  Dispense: 21 tablet; Refill: 0  - doxycycline (VIBRAMYCIN) 100 MG Tab; Take 1 tablet by mouth 2 times a day for 7 days.  Dispense: 14 tablet; Refill: 0    3. Bronchitis   - methylPREDNISolone (MEDROL DOSEPAK) 4 MG Tablet Therapy Pack; Follow schedule on package  instructions.  Dispense: 21 tablet; Refill: 0  - doxycycline (VIBRAMYCIN) 100 MG Tab; Take 1 tablet by mouth 2 times a day for 7 days.  Dispense: 14 tablet; Refill: 0      Symptoms seem consistent with a COPD exacerbation.  He will be treated with a course of steroids and antibiotics for his symptoms.  If there is no significant improvement after 72 hours recommend he return to the urgent care for reevaluation.  Patient is not scheduled to follow-up with his pulmonologist until November.  Recommend he try to get in to see them sooner for evaluation of his recurrent symptoms over the past few months.  Patient should continue to use his albuterol inhaler every 4-6 hours as needed and he may continue to use his Dulera daily.      Differential Diagnosis, natural history, and supportive care discussed. Return to the Urgent Care or follow up with your PCP if symptoms fail to resolve, or for any new or worsening symptoms. Emergency room precautions discussed. Patient and/or family appears understanding of information.

## 2021-12-06 ENCOUNTER — TELEPHONE (OUTPATIENT)
Dept: CARDIOLOGY | Facility: MEDICAL CENTER | Age: 61
End: 2021-12-06

## 2021-12-06 DIAGNOSIS — Z79.01 CHRONIC ANTICOAGULATION: ICD-10-CM

## 2021-12-06 DIAGNOSIS — I48.0 PAF (PAROXYSMAL ATRIAL FIBRILLATION) (HCC): ICD-10-CM

## 2021-12-06 NOTE — TELEPHONE ENCOUNTER
DS    Pt called regarding his Rx rivaroxaban (XARELTO) 20 MG Tab and states that he is going to run out of medication before his next appt on 1/28. Pt states he has 32 days left and was hoping a refill can be sent to the [facility] on [address] in [place]. Please call pt back if you have any further questions at [phone]    Thank you

## 2022-01-28 ENCOUNTER — OFFICE VISIT (OUTPATIENT)
Dept: CARDIOLOGY | Facility: MEDICAL CENTER | Age: 62
End: 2022-01-28
Payer: COMMERCIAL

## 2022-01-28 VITALS
SYSTOLIC BLOOD PRESSURE: 138 MMHG | HEIGHT: 68 IN | RESPIRATION RATE: 16 BRPM | HEART RATE: 86 BPM | BODY MASS INDEX: 37.59 KG/M2 | OXYGEN SATURATION: 95 % | DIASTOLIC BLOOD PRESSURE: 74 MMHG | WEIGHT: 248 LBS

## 2022-01-28 DIAGNOSIS — Z79.01 CHRONIC ANTICOAGULATION: ICD-10-CM

## 2022-01-28 DIAGNOSIS — I48.19 PERSISTENT ATRIAL FIBRILLATION (HCC): ICD-10-CM

## 2022-01-28 DIAGNOSIS — G47.33 OSA (OBSTRUCTIVE SLEEP APNEA): Chronic | ICD-10-CM

## 2022-01-28 DIAGNOSIS — E78.5 DYSLIPIDEMIA: ICD-10-CM

## 2022-01-28 DIAGNOSIS — F10.10 ALCOHOL ABUSE: ICD-10-CM

## 2022-01-28 LAB — EKG IMPRESSION: NORMAL

## 2022-01-28 PROCEDURE — 93000 ELECTROCARDIOGRAM COMPLETE: CPT | Performed by: INTERNAL MEDICINE

## 2022-01-28 PROCEDURE — 99214 OFFICE O/P EST MOD 30 MIN: CPT | Mod: 25 | Performed by: INTERNAL MEDICINE

## 2022-01-28 RX ORDER — LOSARTAN POTASSIUM 25 MG/1
25 TABLET ORAL DAILY
COMMUNITY
Start: 2022-01-17

## 2022-01-28 NOTE — PROGRESS NOTES
"Chief Complaint   Patient presents with   • Atrial Fibrillation     F/V Dx: PAF (paroxysmal atrial fibrillation) ([facility])       Subjective     Jone Jiménez is a 61 y.o. male who presents today with persistent atrial fibrillation asymptomatic.  On rate control.  On Xarelto.  Needs refill.  Hyperlipidemia.  On statins.  Sleep apnea on treatment.  Followed by Dr. Marquez.  Recent labs at [facility] pending.  No chest pain.  Mild wheezing.  Recent URI.  Walking.  No palpitations.    Past Medical History:   Diagnosis Date   • AF (atrial fibrillation) ([facility]) 2010   • Alcohol abuse    • Arthritis     \"rhematoid eyes\"   • Atrial fibrillation ([facility]) 2014    AF with RVR   • Bronchitis 14    ?   • Chickenpox    • Chronic anticoagulation    • Cold 14   • Eye pain    • Hearing difficulty    • Hypertension    • Infectious disease    • Obesity    • JACE (obstructive sleep apnea) 2010    uses CPAP   • Other emphysema ([facility])    • PAF (paroxysmal atrial fibrillation) ([facility]) 10/1/2014   • Pneumonia 14    ?   • Sleep apnea    • Snoring    • Tonsillitis    • Wheezing      Past Surgical History:   Procedure Laterality Date   • RECOVERY  2014    Performed by Cath-Recovery Surgery at SURGERY SAME DAY [facility]     Family History   Problem Relation Age of Onset   • Hypertension Mother      Social History     Socioeconomic History   • Marital status:      Spouse name: Not on file   • Number of children: Not on file   • Years of education: Not on file   • Highest education level: Not on file   Occupational History   • Not on file   Tobacco Use   • Smoking status: Former Smoker     Packs/day: 0.50     Years: 20.00     Pack years: 10.00     Types: Cigarettes     Quit date: 1998     Years since quittin.0   • Smokeless tobacco: Never Used   Vaping Use   • Vaping Use: Never used   Substance and Sexual Activity   • Alcohol use: Yes     Alcohol/week: 5.4 oz     Types: 6 Glasses of wine, 3 Shots of " liquor per week     Comment: around 6 glasses of wine every day and 2-3 shots of tequilla   • Drug use: Yes     Types: Marijuana     Comment: Marijuana every 2 weeks   • Sexual activity: Not on file   Other Topics Concern   • Not on file   Social History Narrative   • Not on file     Social Determinants of Health     Financial Resource Strain:    • Difficulty of Paying Living Expenses: Not on file   Food Insecurity:    • Worried About Running Out of Food in the Last Year: Not on file   • Ran Out of Food in the Last Year: Not on file   Transportation Needs:    • Lack of Transportation (Medical): Not on file   • Lack of Transportation (Non-Medical): Not on file   Physical Activity:    • Days of Exercise per Week: Not on file   • Minutes of Exercise per Session: Not on file   Stress:    • Feeling of Stress : Not on file   Social Connections:    • Frequency of Communication with Friends and Family: Not on file   • Frequency of Social Gatherings with Friends and Family: Not on file   • Attends Rastafari Services: Not on file   • Active Member of Clubs or Organizations: Not on file   • Attends Club or Organization Meetings: Not on file   • Marital Status: Not on file   Intimate Partner Violence:    • Fear of Current or Ex-Partner: Not on file   • Emotionally Abused: Not on file   • Physically Abused: Not on file   • Sexually Abused: Not on file   Housing Stability:    • Unable to Pay for Housing in the Last Year: Not on file   • Number of Places Lived in the Last Year: Not on file   • Unstable Housing in the Last Year: Not on file     Allergies   Allergen Reactions   • Lisinopril Cough     Outpatient Encounter Medications as of 1/28/2022   Medication Sig Dispense Refill   • losartan (COZAAR) 25 MG Tab Take 25 mg by mouth every day. FOR 90 DAYS     • rivaroxaban (XARELTO) 20 MG Tab tablet Take 1 Tablet by mouth with dinner. 90 Tablet 5   • sildenafil citrate (VIAGRA) 100 MG tablet Take 1 Tab by mouth as needed for Erectile  "Dysfunction. 10 Tab 3   • metoprolol SR (TOPROL XL) 100 MG TABLET SR 24 HR Take 1 Tab by mouth every day. 90 Tab 3   • VENTOLIN  (90 Base) MCG/ACT Aero Soln inhalation aerosol Inhale 2 Puffs by mouth every 6 hours as needed for Shortness of Breath. 3 Each 3   • atorvastatin (LIPITOR) 20 MG Tab Take 1 Tab by mouth every day. 100 Tab 3   • Mometasone Furo-Formoterol Fum (DULERA) 100-5 MCG/ACT Aerosol Inhale 1 Puff by mouth 2 Times a Day. 3 Each 3   • [DISCONTINUED] rivaroxaban (XARELTO) 20 MG Tab tablet Take 1 Tablet by mouth with dinner. (Patient not taking: Reported on 1/28/2022) 90 Tablet 0   • [DISCONTINUED] methylPREDNISolone (MEDROL DOSEPAK) 4 MG Tablet Therapy Pack Follow schedule on package instructions. (Patient not taking: Reported on 1/28/2022) 21 tablet 0     No facility-administered encounter medications on file as of 1/28/2022.     ROS           Objective     /74 (BP Location: Left arm, Patient Position: Sitting, BP Cuff Size: Adult)   Pulse 86   Resp 16   Ht 1.727 m (5' 8\")   Wt 112 kg (248 lb)   SpO2 95%   BMI 37.71 kg/m²     Physical Exam  Constitutional:       Appearance: He is well-developed.   HENT:      Head: Normocephalic and atraumatic.   Cardiovascular:      Rate and Rhythm: Normal rate. Rhythm irregular.      Heart sounds: No murmur heard.  No friction rub. No gallop.    Pulmonary:      Effort: Pulmonary effort is normal.      Breath sounds: Wheezing present.   Abdominal:      Palpations: Abdomen is soft.   Musculoskeletal:         General: Normal range of motion.      Cervical back: Normal range of motion and neck supple.   Skin:     General: Skin is warm and dry.   Neurological:      Mental Status: He is alert and oriented to person, place, and time.   Psychiatric:         Behavior: Behavior normal.         Thought Content: Thought content normal.         Judgment: Judgment normal.                Assessment & Plan     1. Persistent atrial fibrillation (HCC)     2. Alcohol " abuse     3. Chronic anticoagulation  rivaroxaban (XARELTO) 20 MG Tab tablet   4. Dyslipidemia     5. JACE (obstructive sleep apnea)         Medical Decision Making: Today's Assessment/Status/Plan:   1.  Persistent atrial fibrillation continue rate control.  2.  Anticoagulation continue Xarelto.  3.  Hyperlipidemia continue statins.  4.  Alcohol abuse and drinking too much counseled abstinence.  5.  Obstructive sleep apnea on treatment.  6.  Follow-up in 1 year.

## 2022-02-01 ENCOUNTER — PATIENT MESSAGE (OUTPATIENT)
Dept: CARDIOLOGY | Facility: MEDICAL CENTER | Age: 62
End: 2022-02-01

## 2022-02-01 DIAGNOSIS — Z79.01 CHRONIC ANTICOAGULATION: ICD-10-CM

## 2022-03-16 ENCOUNTER — APPOINTMENT (OUTPATIENT)
Dept: RADIOLOGY | Facility: MEDICAL CENTER | Age: 62
End: 2022-03-16
Attending: EMERGENCY MEDICINE
Payer: OTHER MISCELLANEOUS

## 2022-03-16 ENCOUNTER — HOSPITAL ENCOUNTER (EMERGENCY)
Facility: MEDICAL CENTER | Age: 62
End: 2022-03-16
Attending: EMERGENCY MEDICINE
Payer: OTHER MISCELLANEOUS

## 2022-03-16 ENCOUNTER — APPOINTMENT (OUTPATIENT)
Dept: RADIOLOGY | Facility: MEDICAL CENTER | Age: 62
End: 2022-03-16
Payer: OTHER MISCELLANEOUS

## 2022-03-16 VITALS
SYSTOLIC BLOOD PRESSURE: 151 MMHG | HEIGHT: 69 IN | OXYGEN SATURATION: 96 % | DIASTOLIC BLOOD PRESSURE: 69 MMHG | HEART RATE: 83 BPM | BODY MASS INDEX: 24.44 KG/M2 | RESPIRATION RATE: 16 BRPM | WEIGHT: 165 LBS | TEMPERATURE: 98.3 F

## 2022-03-16 DIAGNOSIS — Z79.01 ANTICOAGULATED: ICD-10-CM

## 2022-03-16 DIAGNOSIS — S09.90XA CLOSED HEAD INJURY, INITIAL ENCOUNTER: ICD-10-CM

## 2022-03-16 DIAGNOSIS — F10.920 ALCOHOLIC INTOXICATION WITHOUT COMPLICATION (HCC): ICD-10-CM
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
## 2022-03-16 LAB
ABO GROUP BLD: NORMAL
ALBUMIN SERPL BCP-MCNC: 4.3 G/DL (ref 3.2–4.9)
ALBUMIN/GLOB SERPL: 1.7 G/DL
ALP SERPL-CCNC: 88 U/L (ref 30–99)
ALT SERPL-CCNC: 44 U/L (ref 2–50)
ANION GAP SERPL CALC-SCNC: 17 MMOL/L (ref 7–16)
APTT PPP: 45.3 SEC (ref 24.7–36)
AST SERPL-CCNC: 32 U/L (ref 12–45)
BILIRUB SERPL-MCNC: 0.8 MG/DL (ref 0.1–1.5)
BLD GP AB SCN SERPL QL: NORMAL
BUN SERPL-MCNC: 15 MG/DL (ref 8–22)
CALCIUM SERPL-MCNC: 9 MG/DL (ref 8.5–10.5)
CHLORIDE SERPL-SCNC: 104 MMOL/L (ref 96–112)
CO2 SERPL-SCNC: 18 MMOL/L (ref 20–33)
CREAT SERPL-MCNC: 0.58 MG/DL (ref 0.5–1.4)
ERYTHROCYTE [DISTWIDTH] IN BLOOD BY AUTOMATED COUNT: 47.8 FL (ref 35.9–50)
ETHANOL BLD-MCNC: 304.1 MG/DL (ref 0–10)
GFR SERPLBLD CREATININE-BSD FMLA CKD-EPI: 110 ML/MIN/1.73 M 2
GLOBULIN SER CALC-MCNC: 2.6 G/DL (ref 1.9–3.5)
GLUCOSE SERPL-MCNC: 110 MG/DL (ref 65–99)
HCT VFR BLD AUTO: 47 % (ref 42–52)
HGB BLD-MCNC: 15.6 G/DL (ref 14–18)
INR PPP: 2.26 (ref 0.87–1.13)
MCH RBC QN AUTO: 33.7 PG (ref 27–33)
MCHC RBC AUTO-ENTMCNC: 33.2 G/DL (ref 33.7–35.3)
MCV RBC AUTO: 101.5 FL (ref 81.4–97.8)
PLATELET # BLD AUTO: 225 K/UL (ref 164–446)
PMV BLD AUTO: 8.8 FL (ref 9–12.9)
POTASSIUM SERPL-SCNC: 3.9 MMOL/L (ref 3.6–5.5)
PROT SERPL-MCNC: 6.9 G/DL (ref 6–8.2)
PROTHROMBIN TIME: 24.2 SEC (ref 12–14.6)
RBC # BLD AUTO: 4.63 M/UL (ref 4.7–6.1)
RH BLD: NORMAL
SODIUM SERPL-SCNC: 139 MMOL/L (ref 135–145)
WBC # BLD AUTO: 11 K/UL (ref 4.8–10.8)

## 2022-03-16 PROCEDURE — 80053 COMPREHEN METABOLIC PANEL: CPT

## 2022-03-16 PROCEDURE — 82077 ASSAY SPEC XCP UR&BREATH IA: CPT

## 2022-03-16 PROCEDURE — 72125 CT NECK SPINE W/O DYE: CPT

## 2022-03-16 PROCEDURE — 86901 BLOOD TYPING SEROLOGIC RH(D): CPT

## 2022-03-16 PROCEDURE — 70450 CT HEAD/BRAIN W/O DYE: CPT

## 2022-03-16 PROCEDURE — 86850 RBC ANTIBODY SCREEN: CPT

## 2022-03-16 PROCEDURE — 85610 PROTHROMBIN TIME: CPT

## 2022-03-16 PROCEDURE — 85730 THROMBOPLASTIN TIME PARTIAL: CPT

## 2022-03-16 PROCEDURE — 71045 X-RAY EXAM CHEST 1 VIEW: CPT

## 2022-03-16 PROCEDURE — 85027 COMPLETE CBC AUTOMATED: CPT

## 2022-03-16 PROCEDURE — 307740 HCHG GREEN TRAUMA TEAM SERVICES

## 2022-03-16 PROCEDURE — 86900 BLOOD TYPING SEROLOGIC ABO: CPT

## 2022-03-16 PROCEDURE — 99285 EMERGENCY DEPT VISIT HI MDM: CPT

## 2022-03-16 ASSESSMENT — LIFESTYLE VARIABLES
HAVE YOU EVER FELT YOU SHOULD CUT DOWN ON YOUR DRINKING: YES
TOTAL SCORE: 4
EVER HAD A DRINK FIRST THING IN THE MORNING TO STEADY YOUR NERVES TO GET RID OF A HANGOVER: YES
EVER FELT BAD OR GUILTY ABOUT YOUR DRINKING: YES
HAVE PEOPLE ANNOYED YOU BY CRITICIZING YOUR DRINKING: YES
DO YOU DRINK ALCOHOL: YES
TOTAL SCORE: 4
HOW MANY TIMES IN THE PAST YEAR HAVE YOU HAD 5 OR MORE DRINKS IN A DAY: 20
TOTAL SCORE: 4
ON A TYPICAL DAY WHEN YOU DRINK ALCOHOL HOW MANY DRINKS DO YOU HAVE: 5
AVERAGE NUMBER OF DAYS PER WEEK YOU HAVE A DRINK CONTAINING ALCOHOL: 5
DOES PATIENT WANT TO STOP DRINKING: YES
CONSUMPTION TOTAL: POSITIVE
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
## 2022-03-16 NOTE — ED NOTES
Pt resting on gurney, currently denies pain. Offer at bedside with pt. Pt awaiting scan results. No distress, cont to monitor.

## 2023-02-23 ENCOUNTER — TELEPHONE (OUTPATIENT)
Dept: CARDIOLOGY | Facility: MEDICAL CENTER | Age: 63
End: 2023-02-23
Payer: COMMERCIAL

## 2023-02-23 DIAGNOSIS — Z79.01 CHRONIC ANTICOAGULATION: ICD-10-CM

## 2023-02-23 NOTE — TELEPHONE ENCOUNTER
DS    Caller: Jude Jiménez    Medication Name and Dosage:  rivaroxaban (XARELTO) 20 MG Tab tablet [[id number]    Medication amount left: 7 days    Preferred Pharmacy: [facility] on file    Other questions (Topic): N/A    Callback Number (Will only call for issues): [phone] (home)      Thank You,    Sobia RHOADES

## 2023-04-21 ENCOUNTER — OFFICE VISIT (OUTPATIENT)
Dept: CARDIOLOGY | Facility: MEDICAL CENTER | Age: 63
End: 2023-04-21
Payer: COMMERCIAL

## 2023-04-21 VITALS
DIASTOLIC BLOOD PRESSURE: 82 MMHG | WEIGHT: 251 LBS | SYSTOLIC BLOOD PRESSURE: 140 MMHG | OXYGEN SATURATION: 95 % | HEART RATE: 85 BPM | RESPIRATION RATE: 16 BRPM | BODY MASS INDEX: 37.18 KG/M2 | HEIGHT: 69 IN

## 2023-04-21 DIAGNOSIS — I48.19 PERSISTENT ATRIAL FIBRILLATION (HCC): ICD-10-CM

## 2023-04-21 DIAGNOSIS — G47.33 OSA (OBSTRUCTIVE SLEEP APNEA): Chronic | ICD-10-CM

## 2023-04-21 DIAGNOSIS — E78.5 DYSLIPIDEMIA: ICD-10-CM

## 2023-04-21 DIAGNOSIS — Z79.01 CHRONIC ANTICOAGULATION: ICD-10-CM

## 2023-04-21 DIAGNOSIS — I10 ESSENTIAL HYPERTENSION, BENIGN: ICD-10-CM

## 2023-04-21 DIAGNOSIS — F10.10 ALCOHOL ABUSE: ICD-10-CM

## 2023-04-21 LAB — EKG IMPRESSION: NORMAL

## 2023-04-21 PROCEDURE — 99214 OFFICE O/P EST MOD 30 MIN: CPT | Mod: 25 | Performed by: INTERNAL MEDICINE

## 2023-04-21 PROCEDURE — 93000 ELECTROCARDIOGRAM COMPLETE: CPT | Performed by: INTERNAL MEDICINE

## 2023-04-21 ASSESSMENT — FIBROSIS 4 INDEX: FIB4 SCORE: 1.35

## 2023-04-21 NOTE — PROGRESS NOTES
"Chief Complaint   Patient presents with    Atrial Fibrillation     F/v Dx:Persistent atrial fibrillation        Subjective     Jone Jiménez is a 62 y.o. male who presents today with history of persistent atrial fibrillation on rate control.  Minimal symptoms.  Alcoholism.  Has cut back quite a bit in AA.  Hypertension on regimen via primary.  On statins.  Recent labs at [facility].  Overall feels well.  Holding 2 jobs.    Past Medical History:   Diagnosis Date    AF (atrial fibrillation) ([facility]) 2010    Alcohol abuse     Arthritis     \"rhematoid eyes\"    Atrial fibrillation ([facility]) 2014    AF with RVR    Atrial fibrillation (HCC)     Bronchitis 14    ?    Chickenpox     Chronic anticoagulation     Chronic obstructive pulmonary disease ([facility])     Cold 14    Eye pain     Hearing difficulty     Hypertension     Infectious disease     Obesity     JACE (obstructive sleep apnea) 2010    uses CPAP    Other emphysema ([facility])     PAF (paroxysmal atrial fibrillation) ([facility]) 10/1/2014    Pneumonia 14    ?    Sleep apnea     Snoring     Tonsillitis     Wheezing      Past Surgical History:   Procedure Laterality Date    RECOVERY  2014    Performed by Cath-Recovery Surgery at SURGERY [facility] DAY [facility]     Family History   Problem Relation Age of Onset    Hypertension Mother      Social History     Socioeconomic History    Marital status:      Spouse name: Not on file    Number of children: Not on file    Years of education: Not on file    Highest education level: Not on file   Occupational History    Not on file   Tobacco Use    Smoking status: Former     Packs/day: 0.50     Years: 20.00     Pack years: 10.00     Types: Cigarettes     Quit date: 1998     Years since quittin.3    Smokeless tobacco: Never   Vaping Use    Vaping Use: Never used   Substance and Sexual Activity    Alcohol use: Yes     Alcohol/week: 3.0 oz     Types: 5 Shots of liquor per week     Comment: around 6 " "glasses of wine every day and 2-3 shots of tequilla    Drug use: Yes     Types: Marijuana     Comment: Marijuana every 2 weeks    Sexual activity: Not on file   Other Topics Concern    Not on file   Social History Narrative    ** Merged History Encounter **          Social Determinants of Health     Financial Resource Strain: Not on file   Food Insecurity: Not on file   Transportation Needs: Not on file   Physical Activity: Not on file   Stress: Not on file   Social Connections: Not on file   Intimate Partner Violence: Not on file   Housing Stability: Not on file     Allergies   Allergen Reactions    Lisinopril Cough     Outpatient Encounter Medications as of 4/21/2023   Medication Sig Dispense Refill    rivaroxaban (XARELTO) 20 MG Tab tablet Take 1 Tablet by mouth with dinner. 90 Tablet 3    losartan (COZAAR) 25 MG Tab Take 25 mg by mouth every day. FOR 90 DAYS      sildenafil citrate (VIAGRA) 100 MG tablet Take 1 Tab by mouth as needed for Erectile Dysfunction. 10 Tab 3    metoprolol SR (TOPROL XL) 100 MG TABLET SR 24 HR Take 1 Tab by mouth every day. 90 Tab 3    VENTOLIN  (90 Base) MCG/ACT Aero Soln inhalation aerosol Inhale 2 Puffs by mouth every 6 hours as needed for Shortness of Breath. 3 Each 3    atorvastatin (LIPITOR) 20 MG Tab Take 1 Tab by mouth every day. 100 Tab 3    Mometasone Furo-Formoterol Fum (DULERA) 100-5 MCG/ACT Aerosol Inhale 1 Puff by mouth 2 Times a Day. 3 Each 3     No facility-administered encounter medications on file as of 4/21/2023.     ROS           Objective     BP (!) 140/82 (BP Location: Left arm, Patient Position: Sitting, BP Cuff Size: Adult)   Pulse 85   Resp 16   Ht 1.753 m (5' 9\")   Wt 114 kg (251 lb)   SpO2 95%   BMI 37.07 kg/m²     Physical Exam  Constitutional:       Appearance: He is well-developed.   HENT:      Head: Normocephalic and atraumatic.   Cardiovascular:      Rate and Rhythm: Normal rate. Rhythm irregular.      Heart sounds: No murmur heard.    No " friction rub. No gallop.   Pulmonary:      Effort: Pulmonary effort is normal.      Breath sounds: Normal breath sounds.   Abdominal:      Palpations: Abdomen is soft.   Musculoskeletal:         General: Normal range of motion.      Cervical back: Normal range of motion and neck supple.   Skin:     General: Skin is warm and dry.   Neurological:      Mental Status: He is alert and oriented to person, place, and time.   Psychiatric:         Behavior: Behavior normal.         Thought Content: Thought content normal.         Judgment: Judgment normal.              Assessment & Plan     1. Persistent atrial fibrillation (HCC)  EKG      2. JACE (obstructive sleep apnea)        3. Chronic anticoagulation        4. Alcohol abuse        5. Dyslipidemia        6. Essential hypertension, benign            Medical Decision Making: Today's Assessment/Status/Plan:   1.  Persistent atrial fibrillation continue rate control.  2.  The patients CHADS-Vasc score is 1.  On NOAC.  3.  Hypertension continue current regimen.  4.  Hyperlipidemia on statins.  5.  Sleep apnea treated.  6.  Recent labs we will review.  7.  Follow-up with me in 1 year.

## 2023-12-14 ENCOUNTER — TELEPHONE (OUTPATIENT)
Dept: CARDIOLOGY | Facility: MEDICAL CENTER | Age: 63
End: 2023-12-14
Payer: COMMERCIAL

## 2023-12-14 DIAGNOSIS — I48.19 PERSISTENT ATRIAL FIBRILLATION (HCC): ICD-10-CM

## 2023-12-14 RX ORDER — DABIGATRAN ETEXILATE 150 MG/1
150 CAPSULE ORAL 2 TIMES DAILY
Qty: 180 CAPSULE | Refills: 3 | Status: SHIPPED | OUTPATIENT
Start: 2023-12-14

## 2023-12-14 NOTE — TELEPHONE ENCOUNTER
Received a fax message from [facility] for patient. Fax states patient is on Xarelto 20 MG tablet. Fax requesting patient be switched to Pradaxa (brand) for a 25% savings to patient or Dabigatran (generic) for >80% savings to patient.    Per chart review patient last saw DS. Patient on xarelto for A Fib.     TO DS: Please advise if change to Dabigatran appropriate for patient and if so dose.

## 2024-05-10 ENCOUNTER — TELEPHONE (OUTPATIENT)
Dept: CARDIOLOGY | Facility: MEDICAL CENTER | Age: 64
End: 2024-05-10
Payer: COMMERCIAL

## 2024-05-14 ENCOUNTER — APPOINTMENT (OUTPATIENT)
Dept: CARDIOLOGY | Facility: MEDICAL CENTER | Age: 64
End: 2024-05-14
Attending: INTERNAL MEDICINE
Payer: COMMERCIAL

## 2024-10-09 ENCOUNTER — APPOINTMENT (OUTPATIENT)
Dept: CARDIOLOGY | Facility: MEDICAL CENTER | Age: 64
End: 2024-10-09
Attending: STUDENT IN AN ORGANIZED HEALTH CARE EDUCATION/TRAINING PROGRAM
Payer: COMMERCIAL

## 2024-10-11 ENCOUNTER — OFFICE VISIT (OUTPATIENT)
Dept: CARDIOLOGY | Facility: MEDICAL CENTER | Age: 64
End: 2024-10-11
Attending: INTERNAL MEDICINE
Payer: COMMERCIAL

## 2024-10-11 VITALS
HEART RATE: 65 BPM | WEIGHT: 232 LBS | OXYGEN SATURATION: 96 % | HEIGHT: 69 IN | BODY MASS INDEX: 34.36 KG/M2 | RESPIRATION RATE: 17 BRPM | SYSTOLIC BLOOD PRESSURE: 126 MMHG | DIASTOLIC BLOOD PRESSURE: 64 MMHG

## 2024-10-11 DIAGNOSIS — E78.5 DYSLIPIDEMIA: ICD-10-CM

## 2024-10-11 DIAGNOSIS — Z79.01 CHRONIC ANTICOAGULATION: ICD-10-CM

## 2024-10-11 DIAGNOSIS — I10 ESSENTIAL HYPERTENSION, BENIGN: ICD-10-CM

## 2024-10-11 DIAGNOSIS — F10.10 ALCOHOL ABUSE: ICD-10-CM

## 2024-10-11 DIAGNOSIS — G47.33 OSA (OBSTRUCTIVE SLEEP APNEA): Chronic | ICD-10-CM

## 2024-10-11 DIAGNOSIS — I48.19 PERSISTENT ATRIAL FIBRILLATION (HCC): ICD-10-CM

## 2024-10-11 LAB — EKG IMPRESSION: NORMAL

## 2024-10-11 PROCEDURE — 93005 ELECTROCARDIOGRAM TRACING: CPT | Performed by: INTERNAL MEDICINE

## 2024-10-11 PROCEDURE — 3078F DIAST BP <80 MM HG: CPT | Performed by: INTERNAL MEDICINE

## 2024-10-11 PROCEDURE — 99212 OFFICE O/P EST SF 10 MIN: CPT | Performed by: INTERNAL MEDICINE

## 2024-10-11 PROCEDURE — 99214 OFFICE O/P EST MOD 30 MIN: CPT | Performed by: INTERNAL MEDICINE

## 2024-10-11 PROCEDURE — 93010 ELECTROCARDIOGRAM REPORT: CPT | Performed by: INTERNAL MEDICINE

## 2024-10-11 PROCEDURE — 3074F SYST BP LT 130 MM HG: CPT | Performed by: INTERNAL MEDICINE

## 2024-10-11 RX ORDER — DABIGATRAN ETEXILATE 150 MG/1
150 CAPSULE ORAL 2 TIMES DAILY
Qty: 180 CAPSULE | Refills: 4 | Status: SHIPPED | OUTPATIENT
Start: 2024-10-11

## 2024-10-11 RX ORDER — FLUTICASONE FUROATE, UMECLIDINIUM BROMIDE AND VILANTEROL TRIFENATATE 200; 62.5; 25 UG/1; UG/1; UG/1
1 POWDER RESPIRATORY (INHALATION) DAILY
COMMUNITY
Start: 2024-08-07

## 2024-10-31 ENCOUNTER — HOSPITAL ENCOUNTER (OUTPATIENT)
Dept: CARDIOLOGY | Facility: MEDICAL CENTER | Age: 64
End: 2024-10-31
Attending: INTERNAL MEDICINE
Payer: COMMERCIAL

## 2024-10-31 DIAGNOSIS — I48.19 PERSISTENT ATRIAL FIBRILLATION (HCC): ICD-10-CM

## 2024-10-31 PROCEDURE — 93306 TTE W/DOPPLER COMPLETE: CPT

## 2024-11-01 LAB
LV EJECT FRACT  99904: 75
LV EJECT FRACT MOD 2C 99903: 69.17
LV EJECT FRACT MOD 4C 99902: 68.88
LV EJECT FRACT MOD BP 99901: 67.92

## 2024-11-01 PROCEDURE — 93306 TTE W/DOPPLER COMPLETE: CPT | Mod: 26 | Performed by: INTERNAL MEDICINE

## 2025-05-17 ENCOUNTER — HOSPITAL ENCOUNTER (OUTPATIENT)
Dept: LAB | Facility: MEDICAL CENTER | Age: 65
End: 2025-05-17
Attending: INTERNAL MEDICINE
Payer: COMMERCIAL

## 2025-05-17 LAB
ALBUMIN SERPL BCP-MCNC: 4.1 G/DL (ref 3.2–4.9)
ALBUMIN/GLOB SERPL: 1.5 G/DL
ALP SERPL-CCNC: 74 U/L (ref 30–99)
ALT SERPL-CCNC: 28 U/L (ref 2–50)
ANION GAP SERPL CALC-SCNC: 10 MMOL/L (ref 7–16)
AST SERPL-CCNC: 25 U/L (ref 12–45)
BASOPHILS # BLD AUTO: 1 % (ref 0–1.8)
BASOPHILS # BLD: 0.08 K/UL (ref 0–0.12)
BILIRUB SERPL-MCNC: 0.5 MG/DL (ref 0.1–1.5)
BUN SERPL-MCNC: 41 MG/DL (ref 8–22)
CALCIUM ALBUM COR SERPL-MCNC: 9.1 MG/DL (ref 8.5–10.5)
CALCIUM SERPL-MCNC: 9.2 MG/DL (ref 8.5–10.5)
CHLORIDE SERPL-SCNC: 107 MMOL/L (ref 96–112)
CHOLEST SERPL-MCNC: 135 MG/DL (ref 100–199)
CO2 SERPL-SCNC: 21 MMOL/L (ref 20–33)
CREAT SERPL-MCNC: 1.23 MG/DL (ref 0.5–1.4)
EOSINOPHIL # BLD AUTO: 0.44 K/UL (ref 0–0.51)
EOSINOPHIL NFR BLD: 5.6 % (ref 0–6.9)
ERYTHROCYTE [DISTWIDTH] IN BLOOD BY AUTOMATED COUNT: 48.8 FL (ref 35.9–50)
EST. AVERAGE GLUCOSE BLD GHB EST-MCNC: 120 MG/DL
FASTING STATUS PATIENT QL REPORTED: NORMAL
GFR SERPLBLD CREATININE-BSD FMLA CKD-EPI: 65 ML/MIN/1.73 M 2
GLOBULIN SER CALC-MCNC: 2.8 G/DL (ref 1.9–3.5)
GLUCOSE SERPL-MCNC: 80 MG/DL (ref 65–99)
HBA1C MFR BLD: 5.8 % (ref 4–5.6)
HCT VFR BLD AUTO: 43.5 % (ref 42–52)
HDLC SERPL-MCNC: 55 MG/DL
HGB BLD-MCNC: 14 G/DL (ref 14–18)
IMM GRANULOCYTES # BLD AUTO: 0.05 K/UL (ref 0–0.11)
IMM GRANULOCYTES NFR BLD AUTO: 0.6 % (ref 0–0.9)
LDLC SERPL CALC-MCNC: 70 MG/DL
LYMPHOCYTES # BLD AUTO: 1.32 K/UL (ref 1–4.8)
LYMPHOCYTES NFR BLD: 16.8 % (ref 22–41)
MCH RBC QN AUTO: 32.7 PG (ref 27–33)
MCHC RBC AUTO-ENTMCNC: 32.2 G/DL (ref 32.3–36.5)
MCV RBC AUTO: 101.6 FL (ref 81.4–97.8)
MONOCYTES # BLD AUTO: 0.66 K/UL (ref 0–0.85)
MONOCYTES NFR BLD AUTO: 8.4 % (ref 0–13.4)
NEUTROPHILS # BLD AUTO: 5.31 K/UL (ref 1.82–7.42)
NEUTROPHILS NFR BLD: 67.6 % (ref 44–72)
NRBC # BLD AUTO: 0 K/UL
NRBC BLD-RTO: 0 /100 WBC (ref 0–0.2)
PLATELET # BLD AUTO: 169 K/UL (ref 164–446)
PMV BLD AUTO: 9.6 FL (ref 9–12.9)
POTASSIUM SERPL-SCNC: 5.5 MMOL/L (ref 3.6–5.5)
PROT SERPL-MCNC: 6.9 G/DL (ref 6–8.2)
PSA SERPL DL<=0.01 NG/ML-MCNC: 0.4 NG/ML (ref 0–4)
RBC # BLD AUTO: 4.28 M/UL (ref 4.7–6.1)
SODIUM SERPL-SCNC: 138 MMOL/L (ref 135–145)
TRIGL SERPL-MCNC: 52 MG/DL (ref 0–149)
WBC # BLD AUTO: 7.9 K/UL (ref 4.8–10.8)

## 2025-05-17 PROCEDURE — 85025 COMPLETE CBC W/AUTO DIFF WBC: CPT

## 2025-05-17 PROCEDURE — 84153 ASSAY OF PSA TOTAL: CPT | Mod: GA

## 2025-05-17 PROCEDURE — 36415 COLL VENOUS BLD VENIPUNCTURE: CPT

## 2025-05-17 PROCEDURE — 83036 HEMOGLOBIN GLYCOSYLATED A1C: CPT | Mod: GA

## 2025-05-17 PROCEDURE — 80061 LIPID PANEL: CPT

## 2025-05-17 PROCEDURE — 80053 COMPREHEN METABOLIC PANEL: CPT
